# Patient Record
Sex: FEMALE | Race: WHITE | HISPANIC OR LATINO | Employment: OTHER | URBAN - METROPOLITAN AREA
[De-identification: names, ages, dates, MRNs, and addresses within clinical notes are randomized per-mention and may not be internally consistent; named-entity substitution may affect disease eponyms.]

---

## 2017-11-09 ENCOUNTER — HISTORICAL (OUTPATIENT)
Dept: CARDIOLOGY | Facility: HOSPITAL | Age: 82
End: 2017-11-09

## 2017-11-15 ENCOUNTER — HISTORICAL (OUTPATIENT)
Dept: ADMINISTRATIVE | Facility: HOSPITAL | Age: 82
End: 2017-11-15

## 2017-11-15 LAB
ALBUMIN SERPL-MCNC: 3.9 GM/DL (ref 3.4–5)
ALBUMIN/GLOB SERPL: 1.2 RATIO (ref 1.1–2)
ALP SERPL-CCNC: 103 UNIT/L (ref 38–126)
ALT SERPL-CCNC: 16 UNIT/L (ref 12–78)
APPEARANCE, UA: ABNORMAL
AST SERPL-CCNC: 8 UNIT/L (ref 15–37)
BACTERIA #/AREA URNS AUTO: ABNORMAL /HPF
BILIRUB SERPL-MCNC: 0.7 MG/DL (ref 0.2–1)
BILIRUB UR QL STRIP: NEGATIVE
BILIRUBIN DIRECT+TOT PNL SERPL-MCNC: 0.2 MG/DL (ref 0–0.5)
BILIRUBIN DIRECT+TOT PNL SERPL-MCNC: 0.5 MG/DL (ref 0–0.8)
BUN SERPL-MCNC: 24 MG/DL (ref 7–18)
CALCIUM SERPL-MCNC: 10.1 MG/DL (ref 8.5–10.1)
CHLORIDE SERPL-SCNC: 109 MMOL/L (ref 98–107)
CHOLEST SERPL-MCNC: 152 MG/DL (ref 0–200)
CHOLEST/HDLC SERPL: 2.8 {RATIO} (ref 0–4)
CO2 SERPL-SCNC: 25 MMOL/L (ref 21–32)
COLOR UR: YELLOW
CREAT SERPL-MCNC: 1.09 MG/DL (ref 0.55–1.02)
CREAT UR-MCNC: 167 MG/DL
DEPRECATED CALCIDIOL+CALCIFEROL SERPL-MC: 36.09 NG/ML (ref 30–80)
ERYTHROCYTE [DISTWIDTH] IN BLOOD BY AUTOMATED COUNT: 13.1 % (ref 11.5–17)
EST. AVERAGE GLUCOSE BLD GHB EST-MCNC: 131 MG/DL
GLOBULIN SER-MCNC: 3.3 GM/DL (ref 2.4–3.5)
GLUCOSE (UA): NEGATIVE
GLUCOSE SERPL-MCNC: 93 MG/DL (ref 74–106)
HBA1C MFR BLD: 6.2 % (ref 4.2–6.3)
HCT VFR BLD AUTO: 44.9 % (ref 37–47)
HDLC SERPL-MCNC: 55 MG/DL (ref 35–60)
HGB BLD-MCNC: 14.1 GM/DL (ref 12–16)
HGB UR QL STRIP: ABNORMAL
KETONES UR QL STRIP: NEGATIVE
LDLC SERPL CALC-MCNC: 80 MG/DL (ref 0–129)
LEUKOCYTE ESTERASE UR QL STRIP: ABNORMAL
MCH RBC QN AUTO: 28.7 PG (ref 27–31)
MCHC RBC AUTO-ENTMCNC: 31.4 GM/DL (ref 33–36)
MCV RBC AUTO: 91.4 FL (ref 80–94)
MICROALBUMIN UR-MCNC: 3.9 MG/DL
MICROALBUMIN/CREAT RATIO PNL UR: 23.5 MG/GM CR (ref 0–30)
NITRITE UR QL STRIP.AUTO: NEGATIVE
PH UR STRIP: 5.5 [PH] (ref 5–9)
PLATELET # BLD AUTO: 263 X10(3)/MCL (ref 130–400)
PMV BLD AUTO: 10.4 FL (ref 9.4–12.4)
POTASSIUM SERPL-SCNC: 4.2 MMOL/L (ref 3.5–5.1)
PROT SERPL-MCNC: 7.2 GM/DL (ref 6.4–8.2)
PROT UR QL STRIP: NEGATIVE
RBC # BLD AUTO: 4.91 X10(6)/MCL (ref 4.2–5.4)
RBC #/AREA URNS HPF: ABNORMAL /[HPF]
SODIUM SERPL-SCNC: 144 MMOL/L (ref 136–145)
SP GR UR STRIP: 1.02 (ref 1–1.03)
SQUAMOUS EPITHELIAL, UA: 6 /HPF (ref 0–4)
TRIGL SERPL-MCNC: 85 MG/DL (ref 30–150)
TSH SERPL-ACNC: 2.38 MIU/ML (ref 0.36–3.74)
UROBILINOGEN UR STRIP-ACNC: 0.2
VLDLC SERPL CALC-MCNC: 17 MG/DL
WBC # SPEC AUTO: 10 X10(3)/MCL (ref 4.5–11.5)
WBC #/AREA URNS AUTO: 71 /HPF (ref 0–3)

## 2022-07-18 ENCOUNTER — HOSPITAL ENCOUNTER (INPATIENT)
Facility: HOSPITAL | Age: 87
LOS: 15 days | Discharge: HOSPICE | DRG: 480 | End: 2022-08-02
Attending: EMERGENCY MEDICINE | Admitting: INTERNAL MEDICINE
Payer: MEDICARE

## 2022-07-18 DIAGNOSIS — S72.145A: Primary | ICD-10-CM

## 2022-07-18 DIAGNOSIS — C50.919 STAGE IV BREAST CANCER IN FEMALE: ICD-10-CM

## 2022-07-18 DIAGNOSIS — S72.142A DISPLACED INTERTROCHANTERIC FRACTURE OF LEFT FEMUR, INITIAL ENCOUNTER FOR CLOSED FRACTURE: ICD-10-CM

## 2022-07-18 DIAGNOSIS — Z01.818 PRE-OP EVALUATION: ICD-10-CM

## 2022-07-18 DIAGNOSIS — T14.90XA TRAUMA: ICD-10-CM

## 2022-07-18 DIAGNOSIS — R42 DIZZINESS: ICD-10-CM

## 2022-07-18 DIAGNOSIS — N63.31 MASS OF AXILLARY TAIL OF RIGHT BREAST: ICD-10-CM

## 2022-07-18 DIAGNOSIS — R09.02 HYPOXEMIA: ICD-10-CM

## 2022-07-18 LAB
ALBUMIN SERPL-MCNC: 3.2 GM/DL (ref 3.4–4.8)
ALBUMIN/GLOB SERPL: 1 RATIO (ref 1.1–2)
ALP SERPL-CCNC: 76 UNIT/L (ref 40–150)
ALT SERPL-CCNC: 23 UNIT/L (ref 0–55)
AMORPH PHOS CRY URNS QL MICRO: ABNORMAL /HPF
APPEARANCE UR: CLEAR
AST SERPL-CCNC: 23 UNIT/L (ref 5–34)
BACTERIA #/AREA URNS AUTO: ABNORMAL /HPF
BASOPHILS # BLD AUTO: 0.03 X10(3)/MCL (ref 0–0.2)
BASOPHILS NFR BLD AUTO: 0.2 %
BILIRUB UR QL STRIP.AUTO: NEGATIVE MG/DL
BILIRUBIN DIRECT+TOT PNL SERPL-MCNC: 0.9 MG/DL
BNP BLD-MCNC: 117 PG/ML
BUN SERPL-MCNC: 19.9 MG/DL (ref 9.8–20.1)
CALCIUM SERPL-MCNC: 9.8 MG/DL (ref 8.4–10.2)
CHLORIDE SERPL-SCNC: 106 MMOL/L (ref 98–111)
CO2 SERPL-SCNC: 23 MMOL/L (ref 23–31)
COLOR UR AUTO: YELLOW
CREAT SERPL-MCNC: 1 MG/DL (ref 0.55–1.02)
EOSINOPHIL # BLD AUTO: 0.01 X10(3)/MCL (ref 0–0.9)
EOSINOPHIL NFR BLD AUTO: 0.1 %
ERYTHROCYTE [DISTWIDTH] IN BLOOD BY AUTOMATED COUNT: 13.5 % (ref 11.5–17)
FLUAV AG UPPER RESP QL IA.RAPID: NOT DETECTED
FLUBV AG UPPER RESP QL IA.RAPID: NOT DETECTED
GLOBULIN SER-MCNC: 3.2 GM/DL (ref 2.4–3.5)
GLUCOSE SERPL-MCNC: 119 MG/DL (ref 75–121)
GLUCOSE UR QL STRIP.AUTO: NEGATIVE MG/DL
HCT VFR BLD AUTO: 38.4 % (ref 37–47)
HGB BLD-MCNC: 12.6 GM/DL (ref 12–16)
IMM GRANULOCYTES # BLD AUTO: 0.06 X10(3)/MCL (ref 0–0.04)
IMM GRANULOCYTES NFR BLD AUTO: 0.5 %
KETONES UR QL STRIP.AUTO: NEGATIVE MG/DL
LEUKOCYTE ESTERASE UR QL STRIP.AUTO: ABNORMAL UNIT/L
LYMPHOCYTES # BLD AUTO: 0.67 X10(3)/MCL (ref 0.6–4.6)
LYMPHOCYTES NFR BLD AUTO: 5.2 %
MCH RBC QN AUTO: 29.4 PG (ref 27–31)
MCHC RBC AUTO-ENTMCNC: 32.8 MG/DL (ref 33–36)
MCV RBC AUTO: 89.5 FL (ref 80–94)
MONOCYTES # BLD AUTO: 0.59 X10(3)/MCL (ref 0.1–1.3)
MONOCYTES NFR BLD AUTO: 4.5 %
NEUTROPHILS # BLD AUTO: 11.6 X10(3)/MCL (ref 2.1–9.2)
NEUTROPHILS NFR BLD AUTO: 89.5 %
NITRITE UR QL STRIP.AUTO: NEGATIVE
NRBC BLD AUTO-RTO: 0 %
PH UR STRIP.AUTO: 7 [PH]
PLATELET # BLD AUTO: 276 X10(3)/MCL (ref 130–400)
PMV BLD AUTO: 10.4 FL (ref 7.4–10.4)
POTASSIUM SERPL-SCNC: 4.4 MMOL/L (ref 3.5–5.1)
PROT SERPL-MCNC: 6.4 GM/DL (ref 5.8–7.6)
PROT UR QL STRIP.AUTO: NEGATIVE MG/DL
RBC # BLD AUTO: 4.29 X10(6)/MCL (ref 4.2–5.4)
RBC #/AREA URNS AUTO: ABNORMAL /HPF
RBC UR QL AUTO: NEGATIVE UNIT/L
SARS-COV-2 RNA RESP QL NAA+PROBE: NOT DETECTED
SODIUM SERPL-SCNC: 138 MMOL/L (ref 132–146)
SP GR UR STRIP.AUTO: 1.02
SQUAMOUS #/AREA URNS AUTO: ABNORMAL /HPF
TROPONIN I SERPL-MCNC: 0.02 NG/ML (ref 0–0.04)
TSH SERPL-ACNC: 2.49 UIU/ML (ref 0.35–4.94)
UROBILINOGEN UR STRIP-ACNC: 0.2 MG/DL
WBC # SPEC AUTO: 13 X10(3)/MCL (ref 4.5–11.5)
WBC #/AREA URNS AUTO: ABNORMAL /HPF

## 2022-07-18 PROCEDURE — 36415 COLL VENOUS BLD VENIPUNCTURE: CPT | Performed by: EMERGENCY MEDICINE

## 2022-07-18 PROCEDURE — 84484 ASSAY OF TROPONIN QUANT: CPT | Performed by: EMERGENCY MEDICINE

## 2022-07-18 PROCEDURE — 81001 URINALYSIS AUTO W/SCOPE: CPT | Performed by: EMERGENCY MEDICINE

## 2022-07-18 PROCEDURE — 11000001 HC ACUTE MED/SURG PRIVATE ROOM

## 2022-07-18 PROCEDURE — 80053 COMPREHEN METABOLIC PANEL: CPT | Performed by: EMERGENCY MEDICINE

## 2022-07-18 PROCEDURE — 93005 ELECTROCARDIOGRAM TRACING: CPT

## 2022-07-18 PROCEDURE — 84443 ASSAY THYROID STIM HORMONE: CPT | Performed by: EMERGENCY MEDICINE

## 2022-07-18 PROCEDURE — 93010 EKG 12-LEAD: ICD-10-PCS | Mod: ,,, | Performed by: INTERNAL MEDICINE

## 2022-07-18 PROCEDURE — 25500020 PHARM REV CODE 255: Performed by: EMERGENCY MEDICINE

## 2022-07-18 PROCEDURE — 99285 EMERGENCY DEPT VISIT HI MDM: CPT | Mod: 25

## 2022-07-18 PROCEDURE — 87636 SARSCOV2 & INF A&B AMP PRB: CPT | Performed by: EMERGENCY MEDICINE

## 2022-07-18 PROCEDURE — 85025 COMPLETE CBC W/AUTO DIFF WBC: CPT | Performed by: EMERGENCY MEDICINE

## 2022-07-18 PROCEDURE — 63600175 PHARM REV CODE 636 W HCPCS: Performed by: EMERGENCY MEDICINE

## 2022-07-18 PROCEDURE — 93010 ELECTROCARDIOGRAM REPORT: CPT | Mod: ,,, | Performed by: INTERNAL MEDICINE

## 2022-07-18 PROCEDURE — 83880 ASSAY OF NATRIURETIC PEPTIDE: CPT | Performed by: EMERGENCY MEDICINE

## 2022-07-18 RX ORDER — TALC
6 POWDER (GRAM) TOPICAL NIGHTLY PRN
Status: DISCONTINUED | OUTPATIENT
Start: 2022-07-18 | End: 2022-07-19

## 2022-07-18 RX ORDER — LEVOTHYROXINE SODIUM 50 UG/1
50 TABLET ORAL DAILY
COMMUNITY
Start: 2022-05-08

## 2022-07-18 RX ORDER — HEPARIN SODIUM 5000 [USP'U]/ML
5000 INJECTION, SOLUTION INTRAVENOUS; SUBCUTANEOUS EVERY 12 HOURS
Status: DISCONTINUED | OUTPATIENT
Start: 2022-07-18 | End: 2022-07-19

## 2022-07-18 RX ORDER — LISINOPRIL 20 MG/1
20 TABLET ORAL 2 TIMES DAILY
COMMUNITY
Start: 2022-02-17

## 2022-07-18 RX ORDER — ONDANSETRON 2 MG/ML
4 INJECTION INTRAMUSCULAR; INTRAVENOUS EVERY 8 HOURS PRN
Status: DISCONTINUED | OUTPATIENT
Start: 2022-07-18 | End: 2022-07-19

## 2022-07-18 RX ORDER — ATORVASTATIN CALCIUM 10 MG/1
10 TABLET, FILM COATED ORAL DAILY
Status: ON HOLD | COMMUNITY
Start: 2022-06-22 | End: 2022-08-02 | Stop reason: HOSPADM

## 2022-07-18 RX ORDER — HYDRALAZINE HYDROCHLORIDE 50 MG/1
TABLET, FILM COATED ORAL
Status: ON HOLD | COMMUNITY
Start: 2022-06-01 | End: 2022-08-02 | Stop reason: HOSPADM

## 2022-07-18 RX ORDER — MORPHINE SULFATE 4 MG/ML
1 INJECTION, SOLUTION INTRAMUSCULAR; INTRAVENOUS EVERY 4 HOURS PRN
Status: DISCONTINUED | OUTPATIENT
Start: 2022-07-18 | End: 2022-07-19

## 2022-07-18 RX ORDER — SODIUM CHLORIDE 0.9 % (FLUSH) 0.9 %
10 SYRINGE (ML) INJECTION
Status: DISCONTINUED | OUTPATIENT
Start: 2022-07-18 | End: 2022-07-19

## 2022-07-18 RX ORDER — METOPROLOL SUCCINATE 50 MG/1
50 TABLET, EXTENDED RELEASE ORAL DAILY
COMMUNITY
Start: 2022-05-08

## 2022-07-18 RX ADMIN — IOPAMIDOL 100 ML: 755 INJECTION, SOLUTION INTRAVENOUS at 08:07

## 2022-07-18 RX ADMIN — HEPARIN SODIUM 5000 UNITS: 5000 INJECTION, SOLUTION INTRAVENOUS; SUBCUTANEOUS at 09:07

## 2022-07-18 RX ADMIN — ONDANSETRON HYDROCHLORIDE 4 MG: 2 SOLUTION INTRAMUSCULAR; INTRAVENOUS at 08:07

## 2022-07-18 RX ADMIN — MORPHINE SULFATE 1 MG: 4 INJECTION INTRAVENOUS at 08:07

## 2022-07-18 NOTE — ED PROVIDER NOTES
Encounter Date: 2022       History     Chief Complaint   Patient presents with    Dizziness     Near-syncope, fall, and lt hip pain     Patient is a 89-year-old female who was at her home with her male relative who is currently with her when she stood from the toilet and fell.  She complains of pain in her left groin area only when moving her left hip.  The mail her the fall and found her on the 4 without any mental status changes.  She states she has no pain elsewhere.  She did not hit her head.  She has no neck pain.  He says that she recently lost her dog who  and lived with her for a long time, and her p.o. intake has been decreased.  He also states that she is on antihypertensives chronically.  Otherwise she has not been sick with a cough cold runny nose sore throat nausea vomiting or diarrhea.  She has fallen in the past in a similar fashion.        Review of patient's allergies indicates:   Allergen Reactions    Atropine     Diazepam     Promethazine      No past medical history on file.  No past surgical history on file.  No family history on file.     Review of Systems   Constitutional: Negative for fever.   HENT: Negative for sore throat.    Respiratory: Negative for shortness of breath.    Cardiovascular: Negative for chest pain.   Gastrointestinal: Negative for nausea.   Genitourinary: Negative for dysuria.   Musculoskeletal: Negative for back pain.   Skin: Negative for rash.   Neurological: Negative for weakness.   Hematological: Does not bruise/bleed easily.   All other systems reviewed and are negative.      Physical Exam     Initial Vitals [22 1652]   BP Pulse Resp Temp SpO2   (!) 148/86 74 18 98.6 °F (37 °C) 95 %      MAP       --         Physical Exam    Nursing note and vitals reviewed.  Constitutional: She appears well-developed. No distress.   HENT:   Head: Normocephalic and atraumatic.   Mouth/Throat: Oropharynx is clear and moist.   Eyes: Conjunctivae are normal.   Neck:  Neck supple.   No spine tenderness   Normal range of motion.  Cardiovascular: Normal rate, regular rhythm and normal heart sounds. Exam reveals no gallop and no friction rub.    No murmur heard.  Pulmonary/Chest: Breath sounds normal. No respiratory distress. She has no wheezes. She has no rhonchi. She has no rales.   Abdominal: Abdomen is soft. Bowel sounds are normal. She exhibits no distension. There is no abdominal tenderness. There is no guarding.   Musculoskeletal:         General: No edema.      Cervical back: Normal range of motion and neck supple.      Comments: Left hip is nontender.  There is no external rotation or shortening.     Lymphadenopathy:     She has no cervical adenopathy.   Neurological: She is alert. She displays a negative Romberg sign. Coordination and gait normal. GCS eye subscore is 4. GCS verbal subscore is 5. GCS motor subscore is 6.   Skin: Skin is warm.   Psychiatric: She has a normal mood and affect.         ED Course   Procedures  Labs Reviewed   COMPREHENSIVE METABOLIC PANEL - Abnormal; Notable for the following components:       Result Value    Albumin Level 3.2 (*)     Albumin/Globulin Ratio 1.0 (*)     All other components within normal limits   CBC WITH DIFFERENTIAL - Abnormal; Notable for the following components:    WBC 13.0 (*)     MCHC 32.8 (*)     Neut # 11.6 (*)     IG# 0.06 (*)     All other components within normal limits   B-TYPE NATRIURETIC PEPTIDE - Abnormal; Notable for the following components:    Natriuretic Peptide 117.0 (*)     All other components within normal limits   TSH - Normal   TROPONIN I - Normal   CBC W/ AUTO DIFFERENTIAL    Narrative:     The following orders were created for panel order CBC auto differential.  Procedure                               Abnormality         Status                     ---------                               -----------         ------                     CBC with Differential[[id number]]        Abnormal            Final  result                 Please view results for these tests on the individual orders.   URINALYSIS, REFLEX TO URINE CULTURE   SARS-COV-2 RNA AMPLIFICATION, QUAL     EKG Readings: (Independently Interpreted)   Initial Reading: No STEMI.   EKG time 6:13 p.m. rate 72 normal sinus rhythm axis normal inverted T-waves in V3 4 and 5 QTC is normal       Imaging Results          X-Ray Chest AP Portable (Final result)  Result time 07/18/22 18:26:37    Final result by Selwyn Pink MD (07/18/22 18:26:37)                 Impression:      Areas of hazy right lung opacity, greatest inferiorly.  Some of this may relate to overlying soft tissue, but pneumonia and right pleural effusion also possible.  Follow-up PA and lateral chest radiographs can be considered.      Electronically signed by: Selwyn Pink  Date:    07/18/2022  Time:    18:26             Narrative:    EXAMINATION:  XR CHEST AP PORTABLE    CLINICAL HISTORY:  hypoxemia;    TECHNIQUE:  Frontal view(s) of the chest.    COMPARISON:  Radiography 01/09/2018    FINDINGS:  Hazy opacity in the lower right lung and extending peripherally over the mid and upper right lung.  No consolidation or significant pleural fluid on the left.  No definitive pneumothorax.  Cardiac silhouette within normal limits for technique.                               X-Ray Femur Ap/Lat Left (Final result)  Result time 07/18/22 18:09:34    Final result by Hayley Wilson MD (07/18/22 18:09:34)                 Impression:      Intertrochanteric fracture left hip      Electronically signed by: Hayley Wilson  Date:    07/18/2022  Time:    18:09             Narrative:    EXAMINATION:  XR FEMUR 2 VIEW LEFT    CLINICAL HISTORY:  Injury, unspecified, initial encounter    TECHNIQUE:  AP and lateral views of the left femur were performed.    COMPARISON:  None}    FINDINGS:  There is an inter trochanteric fracture of the left hip.  No significant displacement is seen.  No angulation is seen.  No  other fractures are seen.                               CT Pelvis Without Contrast (Final result)  Result time 07/18/22 18:08:48    Final result by Hayley Wilson MD (07/18/22 18:08:48)                 Impression:      Intertrochanteric fracture seen on the left side as outlined above      Electronically signed by: Hayley Wilson  Date:    07/18/2022  Time:    18:08             Narrative:    EXAMINATION:  CT PELVIS WITHOUT CONTRAST    CLINICAL HISTORY:  Pelvic fracture;    TECHNIQUE:  Multiple axial images were obtained of the pelvis from the iliac crest to the pubic symphysis and sagittal and coronal reconstructions were performed.  No contrast was administered.    COMPARISON:  None    FINDINGS:  There is a fracture along the intertrochanteric region on the left side.  Fracture is not displaced.  There is comminution of the fracture at the level of the greater trochanter.  The ischium appears to be intact on the right and left side.  Iliac bone is intact on the right and left side.  The right proximal femur appears to be intact.  The sacrum is intact.                                 Medications   sodium chloride 0.9% flush 10 mL (has no administration in time range)   melatonin tablet 6 mg (has no administration in time range)   heparin (porcine) injection 5,000 Units (has no administration in time range)   morphine injection 1 mg (has no administration in time range)   ondansetron injection 4 mg (has no administration in time range)     Medical Decision Making:   Clinical Tests:   Lab Tests: Reviewed  Radiological Study: Reviewed  Other:   I have discussed this case with another health care provider.       <> Summary of the Discussion: Dr. Plummer recommends patient be admitted to the hospital for surgery in the morning.  He recommends admission to Dr. Hale who has been paged.             ED Course as of 07/18/22 1923 Mon Jul 18, 2022 1919 Work up and admit orders completed by previous ED MD. I  spoke with Dr. Eduardo coronel to admit to him.  [AG]      ED Course User Index  [AG] Gerald Goldstein MD             Clinical Impression:   Final diagnoses:  [T14.90XA] Trauma  [R09.02] Hypoxemia  [S72.145A] Nondisplaced intertrochanteric fracture of left femur, init (Primary)  [R42] Dizziness          ED Disposition Condition    Admit               Gerald Goldstein MD  07/18/22 1925

## 2022-07-19 ENCOUNTER — ANESTHESIA (OUTPATIENT)
Dept: SURGERY | Facility: HOSPITAL | Age: 87
DRG: 480 | End: 2022-07-19
Payer: MEDICARE

## 2022-07-19 ENCOUNTER — ANESTHESIA EVENT (OUTPATIENT)
Dept: SURGERY | Facility: HOSPITAL | Age: 87
DRG: 480 | End: 2022-07-19
Payer: MEDICARE

## 2022-07-19 DIAGNOSIS — S72.142A DISPLACED INTERTROCHANTERIC FRACTURE OF LEFT FEMUR, INITIAL ENCOUNTER FOR CLOSED FRACTURE: Primary | ICD-10-CM

## 2022-07-19 PROBLEM — S72.145A: Status: ACTIVE | Noted: 2022-07-19

## 2022-07-19 LAB
HCT VFR BLD AUTO: 36.3 % (ref 37–47)
HGB BLD-MCNC: 11.6 GM/DL (ref 12–16)

## 2022-07-19 PROCEDURE — 25000003 PHARM REV CODE 250: Performed by: PHYSICIAN ASSISTANT

## 2022-07-19 PROCEDURE — 37000008 HC ANESTHESIA 1ST 15 MINUTES: Performed by: SPECIALIST

## 2022-07-19 PROCEDURE — 63600175 PHARM REV CODE 636 W HCPCS: Performed by: SPECIALIST

## 2022-07-19 PROCEDURE — 27245 TREAT THIGH FRACTURE: CPT | Mod: AS,LT,, | Performed by: PHYSICIAN ASSISTANT

## 2022-07-19 PROCEDURE — 63600175 PHARM REV CODE 636 W HCPCS: Performed by: NURSE ANESTHETIST, CERTIFIED REGISTERED

## 2022-07-19 PROCEDURE — 36000710: Performed by: SPECIALIST

## 2022-07-19 PROCEDURE — 37000009 HC ANESTHESIA EA ADD 15 MINS: Performed by: SPECIALIST

## 2022-07-19 PROCEDURE — 25000003 PHARM REV CODE 250: Performed by: NURSE ANESTHETIST, CERTIFIED REGISTERED

## 2022-07-19 PROCEDURE — 25000003 PHARM REV CODE 250: Performed by: SPECIALIST

## 2022-07-19 PROCEDURE — C1769 GUIDE WIRE: HCPCS | Performed by: SPECIALIST

## 2022-07-19 PROCEDURE — 11000001 HC ACUTE MED/SURG PRIVATE ROOM

## 2022-07-19 PROCEDURE — 27245 PR OPEN FIX INTER/SUBTROCH FX,IMPLNT: ICD-10-PCS | Mod: AS,LT,, | Performed by: PHYSICIAN ASSISTANT

## 2022-07-19 PROCEDURE — 27800903 OPTIME MED/SURG SUP & DEVICES OTHER IMPLANTS: Performed by: SPECIALIST

## 2022-07-19 PROCEDURE — 27245 TREAT THIGH FRACTURE: CPT | Mod: LT,,, | Performed by: SPECIALIST

## 2022-07-19 PROCEDURE — 85014 HEMATOCRIT: CPT | Performed by: SPECIALIST

## 2022-07-19 PROCEDURE — 25000003 PHARM REV CODE 250: Performed by: EMERGENCY MEDICINE

## 2022-07-19 PROCEDURE — 36000711: Performed by: SPECIALIST

## 2022-07-19 PROCEDURE — 27201423 OPTIME MED/SURG SUP & DEVICES STERILE SUPPLY: Performed by: SPECIALIST

## 2022-07-19 PROCEDURE — 63600175 PHARM REV CODE 636 W HCPCS: Performed by: INTERNAL MEDICINE

## 2022-07-19 PROCEDURE — 63600175 PHARM REV CODE 636 W HCPCS: Performed by: PHYSICIAN ASSISTANT

## 2022-07-19 PROCEDURE — 71000039 HC RECOVERY, EACH ADD'L HOUR: Performed by: SPECIALIST

## 2022-07-19 PROCEDURE — 71000033 HC RECOVERY, INTIAL HOUR: Performed by: SPECIALIST

## 2022-07-19 PROCEDURE — 27245 PR OPEN FIX INTER/SUBTROCH FX,IMPLNT: ICD-10-PCS | Mod: LT,,, | Performed by: SPECIALIST

## 2022-07-19 PROCEDURE — 27000221 HC OXYGEN, UP TO 24 HOURS

## 2022-07-19 PROCEDURE — 36415 COLL VENOUS BLD VENIPUNCTURE: CPT | Performed by: SPECIALIST

## 2022-07-19 PROCEDURE — C1713 ANCHOR/SCREW BN/BN,TIS/BN: HCPCS | Performed by: SPECIALIST

## 2022-07-19 DEVICE — TROCHANTERIC NAIL KIT, TI
Type: IMPLANTABLE DEVICE | Site: HIP | Status: FUNCTIONAL
Brand: GAMMA

## 2022-07-19 DEVICE — IMPLANTABLE DEVICE: Type: IMPLANTABLE DEVICE | Site: HIP | Status: FUNCTIONAL

## 2022-07-19 DEVICE — LOCKING SCREW, FULLY THREADED: Type: IMPLANTABLE DEVICE | Site: HIP | Status: FUNCTIONAL

## 2022-07-19 DEVICE — LAG SCREW, TI
Type: IMPLANTABLE DEVICE | Site: HIP | Status: FUNCTIONAL
Brand: GAMMA

## 2022-07-19 RX ORDER — AMOXICILLIN 250 MG
2 CAPSULE ORAL NIGHTLY
Status: DISCONTINUED | OUTPATIENT
Start: 2022-07-19 | End: 2022-08-02 | Stop reason: HOSPADM

## 2022-07-19 RX ORDER — ACETAMINOPHEN 500 MG
1000 TABLET ORAL
Status: COMPLETED | OUTPATIENT
Start: 2022-07-19 | End: 2022-07-19

## 2022-07-19 RX ORDER — METHOCARBAMOL 500 MG/1
500 TABLET, FILM COATED ORAL EVERY 8 HOURS PRN
Status: DISCONTINUED | OUTPATIENT
Start: 2022-07-19 | End: 2022-08-02 | Stop reason: HOSPADM

## 2022-07-19 RX ORDER — CEFAZOLIN SODIUM 2 G/50ML
2 SOLUTION INTRAVENOUS
Status: DISCONTINUED | OUTPATIENT
Start: 2022-07-19 | End: 2022-07-20

## 2022-07-19 RX ORDER — LISINOPRIL 20 MG/1
20 TABLET ORAL 2 TIMES DAILY
Status: DISCONTINUED | OUTPATIENT
Start: 2022-07-19 | End: 2022-08-02 | Stop reason: HOSPADM

## 2022-07-19 RX ORDER — HYDRALAZINE HYDROCHLORIDE 25 MG/1
25 TABLET, FILM COATED ORAL EVERY 12 HOURS
Status: DISCONTINUED | OUTPATIENT
Start: 2022-07-19 | End: 2022-08-02 | Stop reason: HOSPADM

## 2022-07-19 RX ORDER — DIPHENHYDRAMINE HYDROCHLORIDE 50 MG/ML
25 INJECTION INTRAMUSCULAR; INTRAVENOUS EVERY 6 HOURS PRN
Status: DISCONTINUED | OUTPATIENT
Start: 2022-07-19 | End: 2022-08-02 | Stop reason: HOSPADM

## 2022-07-19 RX ORDER — DEXAMETHASONE SODIUM PHOSPHATE 4 MG/ML
INJECTION, SOLUTION INTRA-ARTICULAR; INTRALESIONAL; INTRAMUSCULAR; INTRAVENOUS; SOFT TISSUE
Status: DISCONTINUED | OUTPATIENT
Start: 2022-07-19 | End: 2022-07-19

## 2022-07-19 RX ORDER — PHENYLEPHRINE HYDROCHLORIDE 10 MG/ML
INJECTION INTRAVENOUS CONTINUOUS PRN
Status: DISCONTINUED | OUTPATIENT
Start: 2022-07-19 | End: 2022-07-19

## 2022-07-19 RX ORDER — ACETAMINOPHEN 500 MG
1000 TABLET ORAL
Status: CANCELLED | OUTPATIENT
Start: 2022-07-19 | End: 2022-07-19

## 2022-07-19 RX ORDER — FAMOTIDINE 20 MG/1
20 TABLET, FILM COATED ORAL DAILY
Status: DISCONTINUED | OUTPATIENT
Start: 2022-07-20 | End: 2022-08-02 | Stop reason: HOSPADM

## 2022-07-19 RX ORDER — MORPHINE SULFATE 4 MG/ML
2 INJECTION, SOLUTION INTRAMUSCULAR; INTRAVENOUS
Status: ACTIVE | OUTPATIENT
Start: 2022-07-19 | End: 2022-07-20

## 2022-07-19 RX ORDER — SODIUM CHLORIDE, SODIUM GLUCONATE, SODIUM ACETATE, POTASSIUM CHLORIDE AND MAGNESIUM CHLORIDE 30; 37; 368; 526; 502 MG/100ML; MG/100ML; MG/100ML; MG/100ML; MG/100ML
1000 INJECTION, SOLUTION INTRAVENOUS CONTINUOUS
Status: CANCELLED | OUTPATIENT
Start: 2022-07-19 | End: 2022-08-18

## 2022-07-19 RX ORDER — POLYETHYLENE GLYCOL 3350 17 G/17G
17 POWDER, FOR SOLUTION ORAL 2 TIMES DAILY
Status: DISCONTINUED | OUTPATIENT
Start: 2022-07-19 | End: 2022-08-02 | Stop reason: HOSPADM

## 2022-07-19 RX ORDER — METOPROLOL SUCCINATE 50 MG/1
50 TABLET, EXTENDED RELEASE ORAL DAILY
Status: DISCONTINUED | OUTPATIENT
Start: 2022-07-20 | End: 2022-08-02 | Stop reason: HOSPADM

## 2022-07-19 RX ORDER — ONDANSETRON 2 MG/ML
4 INJECTION INTRAMUSCULAR; INTRAVENOUS EVERY 6 HOURS PRN
Status: DISCONTINUED | OUTPATIENT
Start: 2022-07-19 | End: 2022-08-02 | Stop reason: HOSPADM

## 2022-07-19 RX ORDER — TRAMADOL HYDROCHLORIDE 50 MG/1
50 TABLET ORAL EVERY 4 HOURS PRN
Status: DISCONTINUED | OUTPATIENT
Start: 2022-07-19 | End: 2022-07-31

## 2022-07-19 RX ORDER — ONDANSETRON 2 MG/ML
4 INJECTION INTRAMUSCULAR; INTRAVENOUS DAILY PRN
Status: DISCONTINUED | OUTPATIENT
Start: 2022-07-19 | End: 2022-08-02 | Stop reason: HOSPADM

## 2022-07-19 RX ORDER — CEFAZOLIN SODIUM 1 G/3ML
INJECTION, POWDER, FOR SOLUTION INTRAMUSCULAR; INTRAVENOUS
Status: DISCONTINUED | OUTPATIENT
Start: 2022-07-19 | End: 2022-07-19

## 2022-07-19 RX ORDER — ONDANSETRON 2 MG/ML
INJECTION INTRAMUSCULAR; INTRAVENOUS
Status: DISCONTINUED | OUTPATIENT
Start: 2022-07-19 | End: 2022-07-19

## 2022-07-19 RX ORDER — HYDROMORPHONE HYDROCHLORIDE 2 MG/ML
0.2 INJECTION, SOLUTION INTRAMUSCULAR; INTRAVENOUS; SUBCUTANEOUS EVERY 5 MIN PRN
Status: DISCONTINUED | OUTPATIENT
Start: 2022-07-19 | End: 2022-08-02 | Stop reason: HOSPADM

## 2022-07-19 RX ORDER — ACETAMINOPHEN 500 MG
500 TABLET ORAL
Status: DISCONTINUED | OUTPATIENT
Start: 2022-07-19 | End: 2022-07-31

## 2022-07-19 RX ORDER — LACTULOSE 10 G/15ML
20 SOLUTION ORAL EVERY 6 HOURS PRN
Status: DISCONTINUED | OUTPATIENT
Start: 2022-07-19 | End: 2022-08-02 | Stop reason: HOSPADM

## 2022-07-19 RX ORDER — CALCIUM CARBONATE 200(500)MG
500 TABLET,CHEWABLE ORAL 3 TIMES DAILY PRN
Status: DISCONTINUED | OUTPATIENT
Start: 2022-07-19 | End: 2022-08-02 | Stop reason: HOSPADM

## 2022-07-19 RX ORDER — SODIUM CHLORIDE 9 MG/ML
INJECTION, SOLUTION INTRAVENOUS CONTINUOUS
Status: DISCONTINUED | OUTPATIENT
Start: 2022-07-19 | End: 2022-07-23

## 2022-07-19 RX ORDER — SODIUM CHLORIDE AND POTASSIUM CHLORIDE 150; 900 MG/100ML; MG/100ML
INJECTION, SOLUTION INTRAVENOUS
Status: COMPLETED | OUTPATIENT
Start: 2022-07-19 | End: 2022-07-19

## 2022-07-19 RX ORDER — LIDOCAINE HYDROCHLORIDE 20 MG/ML
INJECTION, SOLUTION EPIDURAL; INFILTRATION; INTRACAUDAL; PERINEURAL
Status: DISCONTINUED | OUTPATIENT
Start: 2022-07-19 | End: 2022-07-19

## 2022-07-19 RX ORDER — GABAPENTIN 300 MG/1
600 CAPSULE ORAL
Status: DISCONTINUED | OUTPATIENT
Start: 2022-07-19 | End: 2022-07-19

## 2022-07-19 RX ORDER — EPHEDRINE SULFATE 50 MG/ML
INJECTION, SOLUTION INTRAVENOUS
Status: DISCONTINUED | OUTPATIENT
Start: 2022-07-19 | End: 2022-07-19

## 2022-07-19 RX ORDER — METOCLOPRAMIDE HYDROCHLORIDE 5 MG/ML
10 INJECTION INTRAMUSCULAR; INTRAVENOUS EVERY 6 HOURS
Status: COMPLETED | OUTPATIENT
Start: 2022-07-19 | End: 2022-07-20

## 2022-07-19 RX ORDER — FENTANYL CITRATE 50 UG/ML
INJECTION, SOLUTION INTRAMUSCULAR; INTRAVENOUS
Status: DISCONTINUED | OUTPATIENT
Start: 2022-07-19 | End: 2022-07-19

## 2022-07-19 RX ORDER — PROPOFOL 10 MG/ML
VIAL (ML) INTRAVENOUS
Status: DISCONTINUED | OUTPATIENT
Start: 2022-07-19 | End: 2022-07-19

## 2022-07-19 RX ORDER — ROCURONIUM BROMIDE 10 MG/ML
INJECTION, SOLUTION INTRAVENOUS
Status: DISCONTINUED | OUTPATIENT
Start: 2022-07-19 | End: 2022-07-19

## 2022-07-19 RX ORDER — KETOROLAC TROMETHAMINE 30 MG/ML
15 INJECTION, SOLUTION INTRAMUSCULAR; INTRAVENOUS EVERY 6 HOURS
Status: COMPLETED | OUTPATIENT
Start: 2022-07-19 | End: 2022-07-20

## 2022-07-19 RX ORDER — ENOXAPARIN SODIUM 100 MG/ML
30 INJECTION SUBCUTANEOUS EVERY 24 HOURS
Status: DISCONTINUED | OUTPATIENT
Start: 2022-07-20 | End: 2022-07-21

## 2022-07-19 RX ORDER — MAG HYDROX/ALUMINUM HYD/SIMETH 200-200-20
30 SUSPENSION, ORAL (FINAL DOSE FORM) ORAL EVERY 6 HOURS PRN
Status: DISCONTINUED | OUTPATIENT
Start: 2022-07-19 | End: 2022-08-02 | Stop reason: HOSPADM

## 2022-07-19 RX ORDER — GABAPENTIN 300 MG/1
300 CAPSULE ORAL
Status: COMPLETED | OUTPATIENT
Start: 2022-07-19 | End: 2022-07-19

## 2022-07-19 RX ORDER — TALC
6 POWDER (GRAM) TOPICAL NIGHTLY PRN
Status: DISCONTINUED | OUTPATIENT
Start: 2022-07-19 | End: 2022-08-02 | Stop reason: HOSPADM

## 2022-07-19 RX ORDER — SODIUM CHLORIDE 9 MG/ML
INJECTION, SOLUTION INTRAVENOUS CONTINUOUS
Status: CANCELLED | OUTPATIENT
Start: 2022-07-19

## 2022-07-19 RX ORDER — CEFAZOLIN SODIUM 2 G/50ML
2 SOLUTION INTRAVENOUS
Status: DISCONTINUED | OUTPATIENT
Start: 2022-07-19 | End: 2022-07-19

## 2022-07-19 RX ORDER — CEFAZOLIN SODIUM 2 G/50ML
2 SOLUTION INTRAVENOUS
Status: CANCELLED | OUTPATIENT
Start: 2022-07-19

## 2022-07-19 RX ORDER — KETOROLAC TROMETHAMINE 30 MG/ML
15 INJECTION, SOLUTION INTRAMUSCULAR; INTRAVENOUS
Status: CANCELLED | OUTPATIENT
Start: 2022-07-19 | End: 2022-07-19

## 2022-07-19 RX ORDER — LEVOTHYROXINE SODIUM 50 UG/1
50 TABLET ORAL DAILY
Status: DISCONTINUED | OUTPATIENT
Start: 2022-07-20 | End: 2022-08-02 | Stop reason: HOSPADM

## 2022-07-19 RX ORDER — BISACODYL 10 MG
10 SUPPOSITORY, RECTAL RECTAL DAILY
Status: ACTIVE | OUTPATIENT
Start: 2022-07-22 | End: 2022-07-23

## 2022-07-19 RX ORDER — LIDOCAINE HYDROCHLORIDE 10 MG/ML
1 INJECTION, SOLUTION EPIDURAL; INFILTRATION; INTRACAUDAL; PERINEURAL ONCE
Status: CANCELLED | OUTPATIENT
Start: 2022-07-19 | End: 2022-07-19

## 2022-07-19 RX ORDER — KETOROLAC TROMETHAMINE 30 MG/ML
15 INJECTION, SOLUTION INTRAMUSCULAR; INTRAVENOUS
Status: COMPLETED | OUTPATIENT
Start: 2022-07-19 | End: 2022-07-19

## 2022-07-19 RX ORDER — GABAPENTIN 300 MG/1
600 CAPSULE ORAL
Status: CANCELLED | OUTPATIENT
Start: 2022-07-19

## 2022-07-19 RX ADMIN — FENTANYL CITRATE 25 MCG: 50 INJECTION, SOLUTION INTRAMUSCULAR; INTRAVENOUS at 02:07

## 2022-07-19 RX ADMIN — SENNOSIDES AND DOCUSATE SODIUM 2 TABLET: 50; 8.6 TABLET ORAL at 09:07

## 2022-07-19 RX ADMIN — ROCURONIUM BROMIDE 25 MG: 10 SOLUTION INTRAVENOUS at 01:07

## 2022-07-19 RX ADMIN — POLYETHYLENE GLYCOL 3350 17 G: 17 POWDER, FOR SOLUTION ORAL at 09:07

## 2022-07-19 RX ADMIN — CEFAZOLIN 2 G: 330 INJECTION, POWDER, FOR SOLUTION INTRAMUSCULAR; INTRAVENOUS at 01:07

## 2022-07-19 RX ADMIN — NORETHINDRONE AND ETHINYL ESTRADIOL 10 MG: KIT ORAL at 01:07

## 2022-07-19 RX ADMIN — PROPOFOL 80 MG: 10 INJECTION, EMULSION INTRAVENOUS at 01:07

## 2022-07-19 RX ADMIN — PHENYLEPHRINE HYDROCHLORIDE 0.2 MCG/KG/MIN: 10 INJECTION INTRAVENOUS at 01:07

## 2022-07-19 RX ADMIN — METHOCARBAMOL 500 MG: 500 TABLET ORAL at 09:07

## 2022-07-19 RX ADMIN — ACETAMINOPHEN 500 MG: 500 TABLET, FILM COATED ORAL at 06:07

## 2022-07-19 RX ADMIN — CEFAZOLIN SODIUM 2 G: 2 SOLUTION INTRAVENOUS at 11:07

## 2022-07-19 RX ADMIN — MELATONIN 6 MG: 3 TAB ORAL at 01:07

## 2022-07-19 RX ADMIN — FENTANYL CITRATE 25 MCG: 50 INJECTION, SOLUTION INTRAMUSCULAR; INTRAVENOUS at 01:07

## 2022-07-19 RX ADMIN — GABAPENTIN 300 MG: 300 CAPSULE ORAL at 12:07

## 2022-07-19 RX ADMIN — SODIUM CHLORIDE AND POTASSIUM CHLORIDE: .9; .15 SOLUTION INTRAVENOUS at 01:07

## 2022-07-19 RX ADMIN — KETOROLAC TROMETHAMINE 15 MG: 30 INJECTION, SOLUTION INTRAMUSCULAR; INTRAVENOUS at 11:07

## 2022-07-19 RX ADMIN — SUGAMMADEX 100 MG: 100 INJECTION, SOLUTION INTRAVENOUS at 02:07

## 2022-07-19 RX ADMIN — LIDOCAINE HYDROCHLORIDE 50 MG: 20 INJECTION, SOLUTION EPIDURAL; INFILTRATION; INTRACAUDAL; PERINEURAL at 01:07

## 2022-07-19 RX ADMIN — ACETAMINOPHEN 1000 MG: 500 TABLET ORAL at 12:07

## 2022-07-19 RX ADMIN — PHENYLEPHRINE HYDROCHLORIDE 200 MCG/KG/MIN: 10 INJECTION INTRAVENOUS at 01:07

## 2022-07-19 RX ADMIN — METOCLOPRAMIDE 10 MG: 5 INJECTION, SOLUTION INTRAMUSCULAR; INTRAVENOUS at 11:07

## 2022-07-19 RX ADMIN — DEXAMETHASONE SODIUM PHOSPHATE 4 MG: 4 INJECTION, SOLUTION INTRA-ARTICULAR; INTRALESIONAL; INTRAMUSCULAR; INTRAVENOUS; SOFT TISSUE at 01:07

## 2022-07-19 RX ADMIN — SODIUM CHLORIDE, SODIUM GLUCONATE, SODIUM ACETATE, POTASSIUM CHLORIDE AND MAGNESIUM CHLORIDE: 526; 502; 368; 37; 30 INJECTION, SOLUTION INTRAVENOUS at 01:07

## 2022-07-19 RX ADMIN — SODIUM CHLORIDE: 9 INJECTION, SOLUTION INTRAVENOUS at 06:07

## 2022-07-19 RX ADMIN — ONDANSETRON HYDROCHLORIDE 4 MG: 2 SOLUTION INTRAMUSCULAR; INTRAVENOUS at 01:07

## 2022-07-19 RX ADMIN — KETOROLAC TROMETHAMINE 15 MG: 30 INJECTION, SOLUTION INTRAMUSCULAR; INTRAVENOUS at 06:07

## 2022-07-19 RX ADMIN — KETOROLAC TROMETHAMINE 15 MG: 30 INJECTION, SOLUTION INTRAMUSCULAR at 12:07

## 2022-07-19 RX ADMIN — METOCLOPRAMIDE 10 MG: 5 INJECTION, SOLUTION INTRAMUSCULAR; INTRAVENOUS at 06:07

## 2022-07-19 NOTE — ANESTHESIA PROCEDURE NOTES
Intubation    Date/Time: 7/19/2022 1:17 PM  Performed by: Raudel Nuñez CRNA  Authorized by: Anthony Roque MD     Intubation:     Induction:  Intravenous    Intubated:  Postinduction    Mask Ventilation:  Easy with oral airway    Attempts:  1    Attempted By:  CRNA    Method of Intubation:  Direct    Blade:  Lane 2    Laryngeal View Grade: Grade I - full view of cords      Difficult Airway Encountered?: No      Complications:  None    Airway Device:  Oral endotracheal tube    Airway Device Size:  7.0    Style/Cuff Inflation:  Cuffed    Inflation Amount (mL):  30    Tube secured:  22    Secured at:  The lips    Placement Verified By:  Capnometry    Complicating Factors:  None    Findings Post-Intubation:  BS equal bilateral and atraumatic/condition of teeth unchanged

## 2022-07-19 NOTE — ANESTHESIA PREPROCEDURE EVALUATION
07/19/2022  Leidy Hdez is a 89 y.o. frail female with intertrochanteric fracture of left femur after fall in her bathroom.  She does speak some english well, but seems to prefer Slovak.  Her son is here to assist with pre-op interview.  She denies any recent chest pain, and denies SOB.  She was doing well prior to this recent fall.  Pain is well controlled as long as she remains still.      Pre-op Assessment    I have reviewed the Patient Summary Reports.     I have reviewed the Nursing Notes. I have reviewed the NPO Status.   I have reviewed the Medications.     Review of Systems  Anesthesia Hx:  No problems with previous Anesthesia  Denies Family Hx of Anesthesia complications.   Denies Personal Hx of Anesthesia complications.   Cardiovascular:   Hypertension Valvular problems/Murmurs    Pulmonary:  Pulmonary Normal    Endocrine:   Hypothyroidism        Physical Exam  General: Cooperative, Alert and Oriented  Thin and frail in appearance.  She does answer appropriately.  Airway:  Mallampati: II   Mouth Opening: Normal  TM Distance: Normal  Tongue: Normal  Neck ROM: Normal ROM    Chest/Lungs:  Clear to auscultation, Normal Respiratory Rate    Heart:  Rate: Normal  Rhythm: Regular Rhythm        Anesthesia Plan  Type of Anesthesia, risks & benefits discussed:    Anesthesia Type: Gen ETT  Intra-op Monitoring Plan: Standard ASA Monitors  Post Op Pain Control Plan: multimodal analgesia  Induction:  IV  Airway Plan: Direct, Post-Induction  Informed Consent: Informed consent signed with the Patient representative and all parties understand the risks and agree with anesthesia plan.  All questions answered.   ASA Score: 3 Emergent  Day of Surgery Review of History & Physical: H&P Update referred to the surgeon/provider.    Ready For Surgery From Anesthesia Perspective.     .

## 2022-07-19 NOTE — CONSULTS
[facility] - Emergency Dept  Orthopedics  Consult Note    Patient Name: Leidy Hdez  MRN: [id number]  Admission Date: 7/18/2022  Hospital Length of Stay: 1 days  Attending Provider: Reyes Guzman Jr., MD  Primary Care Provider: Yoni Hale MD    Patient information was obtained from patient and ER records and her son Thom.    Consults  Subjective:     Principal Problem:<principal problem not specified>    Chief Complaint:   Chief Complaint   Patient presents with    Dizziness     Near-syncope, fall, and lt hip pain        HPI:  Patient is a 89-year-old female that lives at her home in [facility].  She fell in her bathroom last night and sustained a nondisplaced fracture of the left intertrochanteric femur.  She complains of left hip pain.  She has no other complaints.    No past medical history on file.    No past surgical history on file.    Review of patient's allergies indicates:   Allergen Reactions    Atropine     Diazepam     Promethazine        Current Facility-Administered Medications   Medication    heparin (porcine) injection 5,000 Units    melatonin tablet 6 mg    morphine injection 1 mg    ondansetron injection 4 mg    sodium chloride 0.9% flush 10 mL     Current Outpatient Medications   Medication Sig    atorvastatin (LIPITOR) 10 MG tablet Take 10 mg by mouth once daily.    hydrALAZINE (APRESOLINE) 50 MG tablet TAKE 1/2 (ONE-HALF) TABLET BY MOUTH TWICE DAILY. IF BLOOD PRESSURE AVERAGE IS GREATEER THAN 130/80 AFTER 2 DAYS, INCREASE TO 1 TABLET TWICE DAILY. IF BLOOD PRESSURE AVERAGE IS GREATER THAN 130/80 AFTER 2 DAYS, INCREASE TO 2 TABLETS TWICE DAILY    levothyroxine (SYNTHROID) 50 MCG tablet Take 50 mcg by mouth once daily.    lisinopriL (PRINIVIL,ZESTRIL) 20 MG tablet Take 20 mg by mouth 2 (two) times daily.    metoprolol succinate (TOPROL-XL) 50 MG 24 hr tablet Take 50 mg by mouth once daily.     Family History    None       Tobacco Use     "Smoking status: Not on file    Smokeless tobacco: Not on file   Substance and Sexual Activity    Alcohol use: Not on file    Drug use: Not on file    Sexual activity: Not on file     ROS  Objective:     Vital Signs (Most Recent):  Temp: 97.9 °F (36.6 °C) (07/19/22 0530)  Pulse: 69 (07/19/22 0707)  Resp: 16 (07/19/22 0707)  BP: 123/78 (07/19/22 0707)  SpO2: (!) 94 % (07/19/22 0707) Vital Signs (24h Range):  Temp:  [97.9 °F (36.6 °C)-98.6 °F (37 °C)] 97.9 °F (36.6 °C)  Pulse:  [64-82] 69  Resp:  [16-18] 16  SpO2:  [91 %-95 %] 94 %  BP: ()/(59-86) 123/78     Weight: 49.9 kg (110 lb)  Height: 5' 3" (160 cm)  Body mass index is 19.49 kg/m².    No intake or output data in the 24 hours ending 07/19/22 0730    Ortho/SPM Exam   Physical exam  General exam:  Well groomed, well nourished, no acute distress  Alert and oriented x3  HEENT:  Pupils are equal, round, and reactive to light, normocephalic, atraumatic  Cardiovascular:  S1 and S2 heard, no murmurs  Pulmonary:  Lungs clear to auscultation bilaterally  Gastrointestinal:  Positive bowel sounds, soft, nontender, nondistended    Left hip exam:  Externally rotated  Tender left hip with rotation  No noticeable swelling  No noticeable atrophy  No bruising  No wounds  2+ dorsal pedal pulse  2+ posterior tibial pulses  Intact sensation and distal motor function  No knee or ankle tenderness  No foot tenderness  Strong dorsiflexion and plantar flexion of the great and lesser toes as well as the ankle    Significant Labs: All pertinent labs within the past 24 hours have been reviewed.    Significant Imaging: X-Ray: I have reviewed all pertinent results/findings and my personal findings are:  Nondisplaced fracture left hip intertrochanteric femur    Assessment/Plan:     There are no hospital problems to display for this patient.      Thank you for your consult. Plan is take patient to the operating room for ORIF left hip intertrochanteric femur fracture.     Risks, " benefits, alternatives, and complications were explained to the patient and/or patient representative. They understand, agree, and want to proceed with the operation/ procedure.      Justus Matthew MD  Orthopedics  [facility] Orth[facility] - Emergency Dept

## 2022-07-19 NOTE — TRANSFER OF CARE
"Anesthesia Transfer of Care Note    Patient: Leidy Hdez    Procedure(s) Performed: Procedure(s) (LRB):  INSERTION, INTRAMEDULLARY VERNELL, FEMUR (Left)    Patient location: PACU    Anesthesia Type: general    Transport from OR: Transported from OR on room air with adequate spontaneous ventilation    Post pain: adequate analgesia    Post assessment: no apparent anesthetic complications    Post vital signs: stable    Level of consciousness: sedated and responds to stimulation    Nausea/Vomiting: no nausea/vomiting    Complications: none    Transfer of care protocol was followed      Last vitals:   Visit Vitals  /64   Pulse 64   Temp 36.6 °C (97.9 °F) (Oral)   Resp 18   Ht 5' 3" (1.6 m)   Wt 49.9 kg (110 lb)   SpO2 (!) 94%   Breastfeeding No   BMI 19.49 kg/m²     "

## 2022-07-19 NOTE — OP NOTE
[facility] Orthopa[facility] - Periop Services  General Surgery  Operative Note    SUMMARY     Date of Procedure: 7/19/2022     Procedure: Procedure(s) (LRB):  INSERTION, INTRAMEDULLARY VERNELL, FEMUR (Left)     ORIF left hip intertrochanteric femur fracture  Surgeon(s) and Role:     * Antonio Matthew MD - Primary    Assistant:  Galindo Braden, certified physician assistant who was necessary and essential for all aspects of the operation including patient positioning, fracture reduction, surgical exposure, fracture fixation and implantation, and wound closure.    Pre-Operative Diagnosis:  Nondisplaced intertrochanteric fracture of left femur, initial encounter for closed fracture [S72.142A]    Post-Operative Diagnosis: Post-Op Diagnosis Codes:     * nondisplaced intertrochanteric fracture of left femur, initial encounter for closed fracture [S72.142A]    Anesthesia: General    Operative Findings (including complications, if any):  Left hip intertrochanteric femur fracture    Description of Technical Procedures:  The patient is a 89-year-old female that fell last night in her bathroom at home.  She was brought to the emergency room by ambulance at [facility].  She was diagnosed with a nondisplaced fracture of the intertrochanteric region of her left hip.  I have seen and evaluated the patient and spoken with not only the patient but also her son Thom.  Risks, benefits, alternatives, and complications of operative and nonoperative treatment were explained to the patient and her son Thom.  They understand, agree, and want to proceed with the operation for an open reduction with internal fixation of the left hip intertrochanteric femur fracture.  Valid informed consent was obtained.  She was brought to the operating room placed supine on the operating room table and underwent general anesthesia.  Once secured on the fracture table the fracture was aligned appropriately and appropriate  visualization was seen with fluoroscopy.  The usual sterile ChloraPrep scrub and paint followed by sterile draping was performed.  And I have been shield drape was placed.  Utilizing fluoroscopy and incision was made 3 finger breaths proximal to the tip of the greater trochanter and the fascia was incised.  I then placed a guide pin at the starting point on the tip of the trochanter and under fluoroscopic guidance advanced this pin to the appropriate location within the femur proximally.  The core Reamer was then used to open up the insertion site.  I then placed a bead tipped guidewire in the shaft of the femur and then reamed distally with 13 mm and proximally with 15.5 mm.  The Munira gamma nail was then placed over the guidewire to the appropriate position and was confirmed to be appropriate radiographically.  The bead tipped guidewire was removed and then a 2nd incision was made and a guide pin was placed proximally for the lag screw.  I then measured reamed and placed the lag screw.  The set screw was engaged and then a 3rd incision was made distally and a distal locking screw was placed.  Multiplanar fluoroscopic images confirmed appropriate fracture reduction and fracture fixation.  The wounds were irrigated suction and closed with Vicryl suture  and staples.  Sterile dressings were applied and patient was taken to recovery room in stable condition.    Estimated Blood Loss (EBL): 50 mL           Implants:   Implant Name Type Inv. Item Serial No.  Lot No. LRB No. Used Action   Lag Screw, TI     MUNIRA [id number] Left 1 Implanted   GUIDE WIRE [id number] BALL TIP - [id number]  GUIDE WIRE [id number] BALL TIP  [employer]. [id number] Left 1 Implanted and Explanted   Reamer Shaft     MUNIRA [id number] Left 1 Implanted   GUIDEWIRE 3.2 X 450 - [id number]  GUIDEWIRE 3.2 X 450  [employer]. [id number] Left 1 Implanted and Explanted   KWIRE FIXATION STRL 3.6D803AC - [id number]  KWIRE FIXATION STRL  [id number]  [employer] TOMI. [id number] Left 1 Implanted and Explanted   NAIL GAMMA 130 DEG [id number] - [id number]  NAIL GAMMA 130 DEG [id number]  [employer] TOMI. [id number] Left 1 Implanted   SCREW LAG TITANIUM 10.5X95 - [id number]  SCREW LAG TITANIUM 10.5X95  [employer] TOMI. [id number] Left 1 Implanted   SCREW LOCKING T2 F/T 5X32.5MM - [id number]  SCREW LOCKING T2 F/T 5X32.5MM  [employer] TOMI. [id number] Left 1 Implanted       Specimens:   Specimen (24h ago, onward)            None                  Condition: Stable    Disposition: PACU - hemodynamically stable.

## 2022-07-20 PROBLEM — R59.9 ADENOPATHY: Status: ACTIVE | Noted: 2022-07-20

## 2022-07-20 PROBLEM — N63.31 MASS OF AXILLARY TAIL OF RIGHT BREAST: Status: ACTIVE | Noted: 2022-07-20

## 2022-07-20 PROBLEM — N63.0 BREAST MASS: Status: ACTIVE | Noted: 2022-07-20

## 2022-07-20 LAB
ANION GAP SERPL CALC-SCNC: 10 MEQ/L
BUN SERPL-MCNC: 22.3 MG/DL (ref 9.8–20.1)
CALCIUM SERPL-MCNC: 8.8 MG/DL (ref 8.4–10.2)
CHLORIDE SERPL-SCNC: 104 MMOL/L (ref 98–111)
CO2 SERPL-SCNC: 17 MMOL/L (ref 23–31)
CREAT SERPL-MCNC: 1.24 MG/DL (ref 0.55–1.02)
CREAT/UREA NIT SERPL: 18
ERYTHROCYTE [DISTWIDTH] IN BLOOD BY AUTOMATED COUNT: 13.6 % (ref 11.5–17)
GLUCOSE SERPL-MCNC: 139 MG/DL (ref 75–121)
HCT VFR BLD AUTO: 31.2 % (ref 37–47)
HGB BLD-MCNC: 10.1 GM/DL (ref 12–16)
MCH RBC QN AUTO: 29.5 PG (ref 27–31)
MCHC RBC AUTO-ENTMCNC: 32.4 MG/DL (ref 33–36)
MCV RBC AUTO: 91.2 FL (ref 80–94)
NRBC BLD AUTO-RTO: 0 %
PLATELET # BLD AUTO: 166 X10(3)/MCL (ref 130–400)
PMV BLD AUTO: 11.1 FL (ref 7.4–10.4)
POTASSIUM SERPL-SCNC: 5 MMOL/L (ref 3.5–5.1)
RBC # BLD AUTO: 3.42 X10(6)/MCL (ref 4.2–5.4)
SODIUM SERPL-SCNC: 131 MMOL/L (ref 132–146)
WBC # SPEC AUTO: 10.9 X10(3)/MCL (ref 4.5–11.5)

## 2022-07-20 PROCEDURE — 97162 PT EVAL MOD COMPLEX 30 MIN: CPT

## 2022-07-20 PROCEDURE — 99222 PR INITIAL HOSPITAL CARE,LEVL II: ICD-10-PCS | Mod: ,,, | Performed by: PHYSICIAN ASSISTANT

## 2022-07-20 PROCEDURE — 94799 UNLISTED PULMONARY SVC/PX: CPT

## 2022-07-20 PROCEDURE — 27000221 HC OXYGEN, UP TO 24 HOURS

## 2022-07-20 PROCEDURE — 80048 BASIC METABOLIC PNL TOTAL CA: CPT | Performed by: INTERNAL MEDICINE

## 2022-07-20 PROCEDURE — 99222 1ST HOSP IP/OBS MODERATE 55: CPT | Mod: ,,, | Performed by: PHYSICIAN ASSISTANT

## 2022-07-20 PROCEDURE — 63600175 PHARM REV CODE 636 W HCPCS: Performed by: SPECIALIST

## 2022-07-20 PROCEDURE — 97166 OT EVAL MOD COMPLEX 45 MIN: CPT

## 2022-07-20 PROCEDURE — 85027 COMPLETE CBC AUTOMATED: CPT | Performed by: INTERNAL MEDICINE

## 2022-07-20 PROCEDURE — 11000001 HC ACUTE MED/SURG PRIVATE ROOM

## 2022-07-20 PROCEDURE — 25000003 PHARM REV CODE 250: Performed by: SPECIALIST

## 2022-07-20 PROCEDURE — 63600175 PHARM REV CODE 636 W HCPCS: Performed by: NURSE PRACTITIONER

## 2022-07-20 PROCEDURE — 36415 COLL VENOUS BLD VENIPUNCTURE: CPT | Performed by: INTERNAL MEDICINE

## 2022-07-20 RX ADMIN — ACETAMINOPHEN 500 MG: 500 TABLET, FILM COATED ORAL at 05:07

## 2022-07-20 RX ADMIN — ENOXAPARIN SODIUM 30 MG: 30 INJECTION SUBCUTANEOUS at 05:07

## 2022-07-20 RX ADMIN — KETOROLAC TROMETHAMINE 15 MG: 30 INJECTION, SOLUTION INTRAMUSCULAR; INTRAVENOUS at 05:07

## 2022-07-20 RX ADMIN — METOCLOPRAMIDE 10 MG: 5 INJECTION, SOLUTION INTRAMUSCULAR; INTRAVENOUS at 12:07

## 2022-07-20 RX ADMIN — LISINOPRIL 20 MG: 20 TABLET ORAL at 09:07

## 2022-07-20 RX ADMIN — HYDRALAZINE HYDROCHLORIDE 25 MG: 25 TABLET ORAL at 09:07

## 2022-07-20 RX ADMIN — ACETAMINOPHEN 500 MG: 500 TABLET, FILM COATED ORAL at 09:07

## 2022-07-20 RX ADMIN — CEFAZOLIN SODIUM 2 G: 2 SOLUTION INTRAVENOUS at 05:07

## 2022-07-20 RX ADMIN — METOPROLOL SUCCINATE 50 MG: 50 TABLET, EXTENDED RELEASE ORAL at 09:07

## 2022-07-20 RX ADMIN — FAMOTIDINE 20 MG: 20 TABLET, FILM COATED ORAL at 09:07

## 2022-07-20 RX ADMIN — POLYETHYLENE GLYCOL 3350 17 G: 17 POWDER, FOR SOLUTION ORAL at 09:07

## 2022-07-20 RX ADMIN — ACETAMINOPHEN 500 MG: 500 TABLET, FILM COATED ORAL at 12:07

## 2022-07-20 RX ADMIN — LEVOTHYROXINE SODIUM 50 MCG: 50 TABLET ORAL at 05:07

## 2022-07-20 RX ADMIN — METOCLOPRAMIDE 10 MG: 5 INJECTION, SOLUTION INTRAMUSCULAR; INTRAVENOUS at 05:07

## 2022-07-20 RX ADMIN — TRAMADOL HYDROCHLORIDE 50 MG: 50 TABLET, COATED ORAL at 01:07

## 2022-07-20 NOTE — PT/OT/SLP EVAL
Occupational Therapy   Evaluation    Name: Leidy Hdez  MRN: [id number]  Admitting Diagnosis:  Nondisplaced intertrochanteric fracture of left femur, init  Recent Surgery: Procedure(s) (LRB):  INSERTION, INTRAMEDULLARY VERNELL, FEMUR (Left) 1 Day Post-Op    Recommendations:     Discharge Recommendations: rehabilitation facility, home with home health (Home c responsibe caregiver)  Discharge Equipment Recommendations:  bedside commode, walker, rolling  Barriers to discharge:  Other (Comment) (Severity of deficits)    Assessment:     Leidy Hdez is a 89 y.o. female with a medical diagnosis of Nondisplaced intertrochanteric fracture of left femur, init. Performance deficits affecting function: weakness, impaired endurance, impaired balance, impaired self care skills, impaired functional mobility.  Pt's son reported he has been staying c her as her caregiver for the past ~8 years. Reported a recent decline in function 2/2 the loss of her dog. Pt limited by generalized weakness, pain c mobility, and decreased endurance. Would benefit from IPR vs Home c HH and 24hr family assist at d/c pending progress c therapy.     Rehab Prognosis: Good; patient would benefit from acute skilled OT services to address these deficits and reach maximum level of function.       Plan:     Patient to be seen 5 x/week, daily to address the above listed problems via self-care/home management, therapeutic activities, therapeutic exercises  · Plan of Care Expires: 08/03/22  · Plan of Care Reviewed with:      Subjective     Chief Complaint: None   Patient/Family Comments/goals: To get stronger     Occupational Profile:  Living Environment: Pt lives in a single level home c her son who is her caregiver. She has a tub c a shower chair.   Previous level of function: Pt's son reported pt was IND but would occasionally ask for assistance c ADLs and mobility whenever she was feeling dizzy.   Equipment Used at Home:  shower chair  Assistance upon  Discharge: Pt's son will be able to assist at d/c.     Pain/Comfort:  · Pain Rating 1: 0/10    Patients cultural, spiritual, Druze conflicts given the current situation: no    Objective:     Communicated with: RN prior to session.  Patient found supine with SCD, pressure relief boots upon OT entry to room.    General Precautions: Standard, fall (Pt's primary language is Filipino but she is able to speak some english\)   Orthopedic Precautions:LLE weight bearing as tolerated   Braces: N/A  Respiratory Status: Nasal cannula, flow 2 L/min    Occupational Performance:    Bed Mobility:    · Patient completed Supine to Sit with minimum assistance    Functional Mobility/Transfers:  · Patient completed Sit <> Stand Transfer with minimum assistance  with  rolling walker   · Patient completed Bed <> Chair Transfer using Step Transfer technique with minimum assistance with rolling walker  · Functional Mobility: Pt ambulated ~5 ft to bedside chair using RW c Min A.     Activities of Daily Living:  · Lower Body Dressing: maximal assistance .    Cognitive/Visual Perceptual:  Cognitive/Psychosocial Skills:     -       Follows Commands/attention:Follows multistep  commands  -       Mood/Affect/Coping skills/emotional control: Cooperative and Pleasant    Physical Exam:  Upper Extremity Strength:    -       Right Upper Extremity: WFL  -       Left Upper Extremity: WFL    Treatment & Education:  Educated pt and son on OT POC- verbalized understanding.   Education:    Patient left up in chair with all lines intact, call button in reach and RN and son present    GOALS:   Multidisciplinary Problems     Occupational Therapy Goals        Problem: Occupational Therapy    Goal Priority Disciplines Outcome Interventions   Occupational Therapy Goal     OT, PT/OT Ongoing, Progressing    Description: Goals to be met by: 7/3  Patient will increase functional independence with ADLs by performing:    LE Dressing with Minimal  Assistance.  Grooming while standing at sink with Stand-by Assistance.  Toileting from toilet with Stand-by Assistance for hygiene and clothing management.   Toilet transfer to toilet with Stand-by Assistance.                     History:     No past medical history on file.    No past surgical history on file.    Time Tracking:     OT Date of Treatment: 07/20/22  OT Start Time: 0857  OT Stop Time: 0912  OT Total Time (min): 15 min    Billable Minutes:Evaluation Moderate complexity     7/20/2022

## 2022-07-20 NOTE — PLAN OF CARE
Problem: Physical Therapy  Goal: Physical Therapy Goal  Description: Goals to be met by: 2022     Patient will increase functional independence with mobility by performin. Supine to sit with Stand-by Assistance  2. Sit to supine with Stand-by Assistance  3. Sit to stand transfer with Stand-by Assistance  4. Gait  x 300 feet with Supervision using Rolling Walker.     Outcome: Ongoing, Progressing

## 2022-07-20 NOTE — PROGRESS NOTES
"No acute events overnight.  Pain controlled.  Resting in bed.    Vital Signs  Temp: 97.4 °F (36.3 °C)  Temp src: Oral  Pulse: 75  Heart Rate Source: Monitor  Resp: 16  SpO2: (!) 93 %  Pulse Oximetry Type: Continuous  Flow (L/min): 2  O2 Device (Oxygen Therapy): nasal cannula  BP: 108/67  BP Location: Right arm  BP Method: Automatic  Patient Position: Lying  Height and Weight  Height: 5' 3" (160 cm)  Height Method: Estimated  Weight: 49.9 kg (110 lb)  Weight Method: Bed Scale  Dosing Weight: 47.3 kg (104 lb 4.4 oz)  BSA (Calculated - sq m): 1.49 sq meters  BMI (Calculated): 19.5  Weight in (lb) to have BMI = 25: 140.8]    +FHL/EHL  Brisk capillary refill distally  Dressing c/d/i  Sensation intact to light touch distally    Recent Lab Results       07/20/22  0538   07/19/22  1445        Anion Gap 10.0         BUN 22.3         BUN/CREAT RATIO 18         Calcium 8.8         Chloride 104         CO2 17         Creatinine 1.24         eGFR if non African American 43         Glucose 139         Hematocrit 31.2   36.3       Hemoglobin 10.1   11.6       MCH 29.5         MCHC 32.4         MCV 91.2         MPV 11.1         nRBC 0.0         Platelets 166         Potassium 5.0         RBC 3.42         RDW 13.6         Sodium 131         WBC 10.9               A/P:  Status post ORIF left hip intertrochanteric femur fracture  Overall patient doing well.  Therapy for mobility and ambulation.  Lovenox for DVT PPx  Discussed findings on CT scan of her chest of possible right breast malignancy.  I discussed this with her son Thom and the patient.  I explained that Dr. Hale is her primary care physician and will address this.    "

## 2022-07-20 NOTE — PLAN OF CARE
Problem: Adult Inpatient Plan of Care  Goal: Plan of Care Review  Outcome: Ongoing, Progressing  Flowsheets (Taken 7/19/2022 2343)  Plan of Care Reviewed With: patient  Goal: Patient-Specific Goal (Individualized)  Outcome: Ongoing, Progressing  Flowsheets (Taken 7/19/2022 2343)  Patient-Specific Goals (Include Timeframe): to be able to go home  Goal: Absence of Hospital-Acquired Illness or Injury  Outcome: Ongoing, Progressing  Intervention: Prevent Skin Injury  Flowsheets (Taken 7/19/2022 2343)  Skin Protection:   transparent dressing maintained   adhesive use limited

## 2022-07-20 NOTE — PROGRESS NOTES
PATIENT NAME:       MATTHEW JOHNSON  YOB: 1925  CSN:                [id number]   MRN:                [id number]  ADMIT DATE:         07/18/2022 16:47:00  PHYSICIAN:          Yoni Hale MD                            PROGRESS NOTE    DATE:      SUBJECTIVE:  Patient, today, she is set up for surgery.  No shortness of breath.    No chest pain.  No palpitations.  No fever, chills, or other problems.    REVIEW OF SYSTEMS:  X12 as above.    PHYSICAL EXAMINATION:  VITAL SIGNS:  Blood pressure 123/78, pulse 69, temp 97.9.  GENERAL APPEARANCE:  She is alert, in no acute distress.  HEART:  Regular rhythm and rate with a murmur.  LUNGS:  Clear.  ABDOMEN:  Soft, nontender.  EXTREMITIES:  No clubbing, cyanosis, or edema.  NEUROLOGICAL:  Nonfocal.    LABS:  H and H 11.6, 36.3.    IMPRESSION:    1. Left intertrochanteric fracture after fall.  2. Hypertension.  3. Dyslipidemia.  4. Hypothyroidism.  5. Right breast mass.    PLAN:    1. Patient will have surgery.  2. Will continue with DVT prophylaxis.  3. Will continue with current medication.    ______________________________  Yoni Hale MD    KAVITA/AQS  DD:  07/20/2022  Time:  08:42AM  DT:  07/20/2022  Time:  09:22AM  Job #:  [id number]      PROGRESS NOTE

## 2022-07-20 NOTE — PROGRESS NOTES
"Nutrition   Progress Note      Recommendations:  - continue soft and bite sized diet as tolerated      Reason for Evaluation:  Identified at risk by screening criteria    Diagnosis:    1. Nondisplaced intertrochanteric fracture of left femur, init    2. Trauma    3. Hypoxemia    4. Dizziness    5. Pre-op evaluation    6. Displaced intertrochanteric fracture of left femur, initial encounter for closed fracture        Relevant Medical History:  No past medical history on file.      Nutrition Diet History:    Factors affecting nutritional intake: none identified at this time    Food / Sikhism / Culture Preferences:  none      Nutrition Prescription Ordered:    Current Diet Order: soft and bite sized    Appetite:  Fair (50% - 75% po intake)    PO intake: 50 - 75 %      Labs / Medications / Procedures:    Nutrition Related Medications: famotidine, miralax, docusate    Nutrition Related Labs:  7/20: Na 131, BUN 22.3, Crea1.24, Glu 139      Anthropometrics:  Height: 5' 3" (1.6 m)  Admit Weight:  Weight: 47.3 kg (104 lb 4.4 oz)  Latest Weight:  49.9 kg (110 lb)    Wt Readings from Last 5 Encounters:   07/19/22 49.9 kg (110 lb)     IBW: 52kg  %IBW: 96%  UBW: 49.9kg  %Weight Change: none  Body mass index is 19.49 kg/m².  BMI classification:  Underweight (BMI less than 22 if > 65 years of age)      Nutrition Narrative:  7/20: pt tolerating diet, sleeping, no weight loss or appetite loss noted on nursing screen, no family in room    Monitoring and Evaluation:    Nutrition Monitoring and Evaluation:  food and beverage intake    Nutrition Risk:  Level of Nutrition Risk:  Low  Frequency of Follow up:  Dietitian will f/up within 7 days.            "

## 2022-07-20 NOTE — PHYSICIAN QUERY
PT Name: Leidy Hdez  MR #: [id number]    DOCUMENTATION CLARIFICATION     CDS/: Erin Nettles RN, CDS   Contact Information: [email or web address]  This form is a permanent document in the medical record.    Query Date: July 20, 2022      By submitting this query, we are merely seeking further clarification of documentation.  Please utilize your independent clinical judgment when addressing the question(s) below.    The Medical Record reflects the following:    Clinical Information Location in Medical Record   PO intake: 50 - 75 %  Body mass index is 19.49 kg/m².  BMI classification:  Underweight (BMI less than 22 if > 65 years of age)    hypertension ,valvular disease. hypothyroidism and osteoarthritis.   Mild dehydration   Mild postoperative anemia. Status post fall with a left intertrochanteric fracture.    INSERTION, INTRAMEDULLARY VERNELL, FEMUR (Left)     EBL 50 cc Nutrition note 7/20        Internal Medicine 7/20        Orthopedic surgery 7/19       Please clarify/confirm the Consultants diagnosis of __Underweight_________:     [  x] Diagnosis ruled in   [  ] Diagnosis ruled out   [  ] Other diagnosis (please specify): _____________________________   [  ] Clinically undetermined

## 2022-07-20 NOTE — H&P
[facility]                       [address] [id number]    PATIENT NAME:       MATTHEW JOHNSON  YOB: 1925  CSN:                [id number]   MRN:                [id number]  ADMIT DATE:         07/18/2022 16:47:00  PHYSICIAN:          Yoni Hale MD                        HISTORY AND PHYSICAL      HISTORY OF PRESENT ILLNESS:  This is a 89-year-old  [place]   female.  She was in her usual state of health, and she was taking a shower when   she put a towel on the counter, and it slipped, and she fell on the  side.    She sustained a nondisplaced fracture to the left intertrochanteric femur.  She   was taken to the emergency room.  She was in pain.  She denied any syncope.  No   chest pain, no palpitations or other problems.    REVIEW OF SYSTEMS:  X12 as above.    PAST MEDICAL HISTORY:  Remarkable for hypertension and valvular disease.  She   has hypothyroidism and osteoarthritis.  She has some diastolic dysfunction, mild   mitral regurgitation, mild aortic regurgitation with normal EF of 55% to 60%.    SURGICAL HISTORY:  Unremarkable.    SOCIAL HISTORY:  She does not smoke, drink in excess, or use any illegal drugs.    FAMILY HISTORY:  Noncontributory.    ALLERGIES:  INCLUDE ATROPINE, VALIUM, AND PROMETHAZINE.     MEDICATIONS:  Please see MARs.    PHYSICAL EXAMINATION:  VITAL SIGNS:  Blood pressure 148/86, pulse 74, temp 98.6.  GENERAL APPEARANCE:  She is alert, in no acute distress, in pain.  HEENT:  Normocephalic, atraumatic.  PERRLA.  EOMI.  NECK:  Supple.  No JVD.  No bruits.  HEART:  She has a regular rhythm and rate.  There is a 2/6 systolic ejection   murmur, increased in the aortic area.  LUNGS:  Clear.  ABDOMEN:  Soft, nontender.  Bowel sounds positive and normal.  GENITAL/RECTAL:  No discharge.  Normal for age.  EXTREMITIES:  There is no clubbing, cyanosis, or edema.  There is  significant   tenderness in the  hip area.  NEUROLOGIC:  Nonfocal.  Cranial nerves 2-12 are intact.  She is oriented x3.    LABS AND IMAGING:  X-rays show a hazy opacity in the right lower lung.  X-ray of   the lt hip shows an intertrochanteric fracture.  Labs are remarkable for a   white cell count 13.  H and H 12.6 and 38.4.  Chem 12 shows a BUN of 19.9,   creatinine 1, albumin 3.2.  .  Troponin 0.016.  Thyroid 2.48.  Urine   negative.  CT of the pelvis shows the lt intertrochanteric fracture in the hip.    CT of the chest shows no PE.  She has a lobulated mass in the right breast and   lobulated right perihilar soft tissue thickening suspicious for metastasis.    EKG shows normal sinus rhythm, some T-wave changes.    IMPRESSION:    1. Status post fall with a lt hip fracture.  2. She has breast mass in the right.  3. She has some right perihilar soft tissue thickening.  4. She has hypothyroidism.  5. Hypertension.  6. Osteoarthritis.  7. Dyslipidemia.    PLAN:    1. Patient will have hip surgery.  2. Eventually, will work up her breast mass, as well as the other abnormalities.  3. Will keep her on some DVT prophylaxis.        ______________________________  MD KAVITA Arenas/WANG  DD:  07/20/2022  Time:  08:42AM  DT:  07/20/2022  Time:  09:17AM  Job #:  [id number]      HISTORY AND PHYSICAL

## 2022-07-20 NOTE — PLAN OF CARE
Reviewed chart. Therapy recommends rehab. Pt has breast mass that she will be needing follow up after rehab. Spoke with son Thom who is at bedside and lives with pt. He confirms [facility] as he is familiar with this location stating it is where pt had her surgery. Pt did have a hip fx.   [facility]

## 2022-07-20 NOTE — CONSULTS
[facility]  Breast Surgical Oncology  Inpatient Consult - H&P        Referring Provider: Yoni Hale MD   PCP: Yoni Hale MD   Care Team:  Orthopedist: Dr. Antonio Matthew     Chief Complaint:  Right breast mass present for over 1 year     Subjective:      HPI:  Leidy Hdez is a pleasant 89 y.o. female who was admitted to the hospital recently after a fall. She sustained a nondisplaced fracture to the left femur and is s/p ORIF left hip intertrochanteric femure fracture. Incidentally, a right breast mass was noted on her CT chest scan. She has felt this palpable mass since the beginning of COVID. It is occasionally tender. A limited history was obtained from her son who reports that she also has a sister who was diagnosed with breast cancer in her 80s. Denies any other family members with cancer. He does report that when she was much younger she did have a breast biopsy/possible procedure performed which was benign.     Imagin. CTA Chest - There is a lobulated mass in the right breast measuring 3.7 x 2.6 cm. There is a right perihilar area of soft tissue thickening measuring approximately 3.3 cm suspicious for malignancy. There is enlarged paratracheal, prevascular, and subcarinal lymphadenopathy.  A representative subcarinal lymph node measures 3.2 x 2.2 cm. There is no lytic or blastic osseous lesion.      Pathology:  None      Family History:  Sister - breast cancer (80s)     Patient History:  No past medical history on file.     There is no height or weight on file to calculate BMI.            Past Surgical History:   Procedure Laterality Date    INTRAMEDULLARY RODDING OF FEMUR Left 2022     Procedure: INSERTION, INTRAMEDULLARY VERNELL, FEMUR;  Surgeon: Antonio Matthew MD;  [facility];  Service: Orthopedics;  Laterality: Left;         Social History              Socioeconomic History    Marital status:                There is no immunization history on file for  this patient.     Medications/Allergies:  No current facility-administered medications for this encounter.  No current outpatient medications on file.     Facility-Administered Medications Ordered in Other Encounters:     0.9%  NaCl infusion, , Intravenous, Continuous, Antonio Matthew MD, Last Rate: 75 mL/hr at 07/19/22 2000, Rate Verify at 07/19/22 2000    acetaminophen tablet 500 mg, 500 mg, Oral, 6 times per day, Antonio Matthew MD, 500 mg at 07/20/22 1213    aluminum-magnesium hydroxide-simethicone 200-200-20 mg/5 mL suspension 30 mL, 30 mL, Oral, Q6H PRN, Antonio Matthew MD    [START ON 7/22/2022] bisacodyL suppository 10 mg, 10 mg, Rectal, Daily, Antonio Matthew MD    calcium carbonate 200 mg calcium (500 mg) chewable tablet 500 mg, 500 mg, Oral, TID PRN, Antonio Matthew MD    diphenhydrAMINE injection 25 mg, 25 mg, Intravenous, Q6H PRN, Anthony Roque MD    enoxaparin injection 30 mg, 30 mg, Subcutaneous, Daily, RAMOS Lal    famotidine tablet 20 mg, 20 mg, Oral, Daily, Antonio Matthew MD, 20 mg at 07/20/22 0914    hydrALAZINE tablet 25 mg, 25 mg, Oral, Q12H, Antonio Matthew MD, 25 mg at 07/20/22 0914    HYDROmorphone (PF) injection 0.2 mg, 0.2 mg, Intravenous, Q5 Min PRN, Anthony Roque MD    lactulose 20 gram/30 mL solution Soln 20 g, 20 g, Oral, Q6H PRN, Antonio Matthew MD    levothyroxine tablet 50 mcg, 50 mcg, Oral, Daily, Antonio Matthew MD, 50 mcg at 07/20/22 0512    lisinopriL tablet 20 mg, 20 mg, Oral, BID, Antonio Matthew MD, 20 mg at 07/20/22 0914    melatonin tablet 6 mg, 6 mg, Oral, Nightly PRN, Antonio Matthew MD    methocarbamoL tablet 500 mg, 500 mg, Oral, Q8H PRN, Antonio Matthew MD, 500 mg at 07/19/22 2125    metoprolol succinate (TOPROL-XL) 24 hr tablet 50 mg, 50 mg, Oral, Daily, Antonio Matthew MD, 50 mg at 07/20/22 0914    morphine injection 2 mg, 2 mg, Intravenous, Q3H PRN, Antonio Matthew MD    ondansetron injection 4 mg, 4 mg, Intravenous, Daily PRN, Anthony Roque MD    ondansetron injection  4 mg, 4 mg, Intravenous, Q6H PRN, Antonio Matthew MD    polyethylene glycol packet 17 g, 17 g, Oral, BID, Antonio Matthew MD, 17 g at 07/20/22 0914    senna-docusate 8.6-50 mg per tablet 2 tablet, 2 tablet, Oral, QHS, Antonio Matthew MD, 2 tablet at 07/19/22 2106    traMADoL tablet 50 mg, 50 mg, Oral, Q4H PRN, Antonio Matthew MD, 50 mg at 07/20/22 1327           Review of patient's allergies indicates:   Allergen Reactions    Atropine      Diazepam      Promethazine           Review of Systems:  Pertinent items are noted in HPI.     Objective:      Vitals:  There were no vitals filed for this visit.     Physical Exam:  General: The patient is awake, alert and oriented times three. The patient is well nourished and in no acute distress.  Neck: The neck is supple. The thyroid is not enlarged.  Musculoskeletal: The patient has a normal range of motion of her bilateral upper extremities.  Chest: Examination of the chest wall fails to reveal any obvious abnormalities. Nonlabored breathing, symmetric expansion.  Breast:  Right: Examination of right breast fails to reveals a dominant nodular hard mass in the UOQ/axillary tail of the right breast. In the axilla, there is also a large fixed hard mass. The nipple is everted without evidence of discharge. There is no skin dimpling with movement of the pectoralis. There are no significant skin changes overlying the breast.   Left: Examination of the left breast fails to reveal any dominant masses or areas of significant focal nodularity. The nipple is everted without evidence of discharge. There is no skin dimpling with movement of the pectoralis. There are no significant skin changes overlying the breast.  Abdomen: The abdomen is soft, flat, nontender and nondistended.  Integumentary: no rashes or skin lesions present  Neurologic: cranial nerves intact, no signs of peripheral neurological deficit, motor/sensory function intact        Assessment and Plan:       Right Breast:  changes  listed above are highly suspicious for breast cancer..  Left Breast:  normal          Patient Active Problem List   Diagnosis    Nondisplaced intertrochanteric fracture of left femur, init    Mass of axillary tail of right breast    Adenopathy         Plan:         I discussed findings on CT Chest and findings on physical exam today. I discussed that her symptoms are most consistent with a breast malignancy and that I am able to palpate a large mass in the breast and also where the lymph nodes are present.     There are no skin findings at this time today that indicate inpatient punch biopsy.      Once patient is discharged from the hospital, I recommend outpatient work up of masses with a BL DG MG and US to evaluate the findings concerning for malignancy. I discussed that she will likely need a biopsy. I will have the breast clinic imaging coordinator call patient to set up these appointments.     After release from hospital, outpatient work-up, biopsy, she will follow up clinically and we can direct treatment accordingly.              All of their questions were answered. Her and her family were advised to call if she develops any questions or concerns.     Codi Baca PA-C      Agree with the above written history and physical, as well as assessment and plan.      This patient was discussed with Dr. Antonio Matthew, Dr. Almaz Kelley, and Dr. Yoni Hale. She states she had the breast mass for at least a year. She recently had undergone surgery for hip fracture. At this time, recommendation is for out patient work-up.       All of questions were answered    Marisol Humphrey MD  Breast Surgical Oncology

## 2022-07-20 NOTE — PLAN OF CARE
Problem: Occupational Therapy  Goal: Occupational Therapy Goal  Description: Goals to be met by: 7/3  Patient will increase functional independence with ADLs by performing:    LE Dressing with Minimal Assistance.  Grooming while standing at sink with Stand-by Assistance.  Toileting from toilet with Stand-by Assistance for hygiene and clothing management.   Toilet transfer to toilet with Stand-by Assistance.    Outcome: Ongoing, Progressing

## 2022-07-20 NOTE — PT/OT/SLP EVAL
Physical Therapy Evaluation    Patient Name:  Leidy Hdez   MRN:  [id number]    Recommendations:     Discharge Recommendations:  rehabilitation facility   Discharge Equipment Recommendations: walker, rolling   Barriers to discharge: acuity of illness    Assessment:     Leidy Hdez is a 89 y.o. female admitted with a medical diagnosis of Nondisplaced intertrochanteric fracture of left femur, init.  She presents with the following impairments/functional limitations:  weakness, impaired endurance, impaired functional mobility, decreased lower extremity function, gait instability, pain, orthopedic precautions .    Rehab Prognosis: Good; patient would benefit from acute skilled PT services to address these deficits and reach maximum level of function.    Recent Surgery: Procedure(s) (LRB):  INSERTION, INTRAMEDULLARY VERNELL, FEMUR (Left) 1 Day Post-Op    Plan:     During this hospitalization, patient to be seen daily to BID to address the identified rehab impairments via gait training, therapeutic activities, therapeutic exercises, neuromuscular re-education and progress toward the following goals:    · Plan of Care Expires:  08/19/22    Subjective     Chief Complaint: pain  Patient/Family Comments/goals: to be independent  Pain/Comfort:  · Pain Rating 1: 4/10  · Location - Side 1: Left  · Location 1: leg  · Pain Addressed 1: Nurse notified, Reposition    Patients cultural, spiritual, Orthodoxy conflicts given the current situation: no    Living Environment:  Pt lives w/ her son in a SL home, no steps to enter.   Prior to admission, patients level of function was independent.  Equipment used at home: none.  DME owned (not currently used): none.  Upon discharge, patient will have assistance from son.    Objective:     Communicated with RN prior to session.  Patient found HOB elevated with peripheral IV, oxygen  upon PT entry to room.    General Precautions: Standard,     Orthopedic Precautions:LLE weight bearing  as tolerated   Braces: N/A  Respiratory Status: Nasal cannula, flow 2 L/min    Exams:  · Cognitive Exam:  Patient is oriented to Person, Place, Time and Situation    Functional Mobility:  · Bed Mobility:     · Supine to Sit: minimum assistance  · Transfers:     · Sit to Stand:  minimum assistance with rolling walker  · Gait: pt demo'd a slow step through gt pattern w/ short step length bilat x 10 ft w/ use of RW and Suyapa    AM-PAC 6 CLICK MOBILITY  Total Score:16     Patient left up in chair with all lines intact, call button in reach and RN notified.    GOALS:   Multidisciplinary Problems     Physical Therapy Goals        Problem: Physical Therapy    Goal Priority Disciplines Outcome Goal Variances Interventions   Physical Therapy Goal     PT, PT/OT Ongoing, Progressing     Description: Goals to be met by: 2022     Patient will increase functional independence with mobility by performin. Supine to sit with Stand-by Assistance  2. Sit to supine with Stand-by Assistance  3. Sit to stand transfer with Stand-by Assistance  4. Gait  x 300 feet with Supervision using Rolling Walker.                      History:     No past medical history on file.    Past Surgical History:   Procedure Laterality Date    INTRAMEDULLARY RODDING OF FEMUR Left 2022    Procedure: INSERTION, INTRAMEDULLARY VERNELL, FEMUR;  Surgeon: Antonio Matthew MD;  Location: [facility];  Service: Orthopedics;  Laterality: Left;       Time Tracking:     PT Received On: 22  PT Start Time: 857     PT Stop Time: 913  PT Total Time (min): 16 min     Billable Minutes: Evaluation 16 mins      2022

## 2022-07-21 LAB
ANION GAP SERPL CALC-SCNC: 5 MEQ/L
BUN SERPL-MCNC: 23.5 MG/DL (ref 9.8–20.1)
CALCIUM SERPL-MCNC: 8.2 MG/DL (ref 8.4–10.2)
CHLORIDE SERPL-SCNC: 106 MMOL/L (ref 98–111)
CLOSTRIDIUM DIFFICILE GDH ANTIGEN (OHS): NEGATIVE
CLOSTRIDIUM DIFFICILE TOXIN A/B (OHS): NEGATIVE
CO2 SERPL-SCNC: 19 MMOL/L (ref 23–31)
CREAT SERPL-MCNC: 0.96 MG/DL (ref 0.55–1.02)
CREAT/UREA NIT SERPL: 24
ERYTHROCYTE [DISTWIDTH] IN BLOOD BY AUTOMATED COUNT: 13.8 % (ref 11.5–17)
GLUCOSE SERPL-MCNC: 104 MG/DL (ref 75–121)
HCT VFR BLD AUTO: 30 % (ref 37–47)
HGB BLD-MCNC: 9.7 GM/DL (ref 12–16)
MCH RBC QN AUTO: 29.5 PG (ref 27–31)
MCHC RBC AUTO-ENTMCNC: 32.3 MG/DL (ref 33–36)
MCV RBC AUTO: 91.2 FL (ref 80–94)
NRBC BLD AUTO-RTO: 0 %
PLATELET # BLD AUTO: 210 X10(3)/MCL (ref 130–400)
PMV BLD AUTO: 10.4 FL (ref 7.4–10.4)
POTASSIUM SERPL-SCNC: 4.8 MMOL/L (ref 3.5–5.1)
RBC # BLD AUTO: 3.29 X10(6)/MCL (ref 4.2–5.4)
SARS-COV-2 RDRP RESP QL NAA+PROBE: NEGATIVE
SODIUM SERPL-SCNC: 130 MMOL/L (ref 132–146)
WBC # SPEC AUTO: 11.5 X10(3)/MCL (ref 4.5–11.5)

## 2022-07-21 PROCEDURE — 99223 1ST HOSP IP/OBS HIGH 75: CPT | Mod: ,,, | Performed by: INTERNAL MEDICINE

## 2022-07-21 PROCEDURE — 85027 COMPLETE CBC AUTOMATED: CPT | Performed by: INTERNAL MEDICINE

## 2022-07-21 PROCEDURE — 25000003 PHARM REV CODE 250: Performed by: INTERNAL MEDICINE

## 2022-07-21 PROCEDURE — 94761 N-INVAS EAR/PLS OXIMETRY MLT: CPT

## 2022-07-21 PROCEDURE — 94799 UNLISTED PULMONARY SVC/PX: CPT

## 2022-07-21 PROCEDURE — 11000001 HC ACUTE MED/SURG PRIVATE ROOM

## 2022-07-21 PROCEDURE — 87635 SARS-COV-2 COVID-19 AMP PRB: CPT | Performed by: INTERNAL MEDICINE

## 2022-07-21 PROCEDURE — 36415 COLL VENOUS BLD VENIPUNCTURE: CPT | Performed by: INTERNAL MEDICINE

## 2022-07-21 PROCEDURE — 27000207 HC ISOLATION

## 2022-07-21 PROCEDURE — 86318 IA INFECTIOUS AGENT ANTIBODY: CPT | Performed by: INTERNAL MEDICINE

## 2022-07-21 PROCEDURE — 99223 PR INITIAL HOSPITAL CARE,LEVL III: ICD-10-PCS | Mod: ,,, | Performed by: INTERNAL MEDICINE

## 2022-07-21 PROCEDURE — 25000003 PHARM REV CODE 250: Performed by: SPECIALIST

## 2022-07-21 PROCEDURE — 80048 BASIC METABOLIC PNL TOTAL CA: CPT | Performed by: INTERNAL MEDICINE

## 2022-07-21 PROCEDURE — 97530 THERAPEUTIC ACTIVITIES: CPT

## 2022-07-21 PROCEDURE — 27000221 HC OXYGEN, UP TO 24 HOURS

## 2022-07-21 RX ADMIN — HYDRALAZINE HYDROCHLORIDE 25 MG: 25 TABLET ORAL at 08:07

## 2022-07-21 RX ADMIN — TRAMADOL HYDROCHLORIDE 50 MG: 50 TABLET, COATED ORAL at 04:07

## 2022-07-21 RX ADMIN — LISINOPRIL 20 MG: 20 TABLET ORAL at 09:07

## 2022-07-21 RX ADMIN — LEVOTHYROXINE SODIUM 50 MCG: 50 TABLET ORAL at 04:07

## 2022-07-21 RX ADMIN — ACETAMINOPHEN 500 MG: 500 TABLET, FILM COATED ORAL at 09:07

## 2022-07-21 RX ADMIN — RIVAROXABAN 10 MG: 10 TABLET, FILM COATED ORAL at 04:07

## 2022-07-21 RX ADMIN — TRAMADOL HYDROCHLORIDE 50 MG: 50 TABLET, COATED ORAL at 08:07

## 2022-07-21 RX ADMIN — HYDRALAZINE HYDROCHLORIDE 25 MG: 25 TABLET ORAL at 09:07

## 2022-07-21 RX ADMIN — ACETAMINOPHEN 500 MG: 500 TABLET, FILM COATED ORAL at 08:07

## 2022-07-21 RX ADMIN — ACETAMINOPHEN 500 MG: 500 TABLET, FILM COATED ORAL at 04:07

## 2022-07-21 RX ADMIN — METOPROLOL SUCCINATE 50 MG: 50 TABLET, EXTENDED RELEASE ORAL at 09:07

## 2022-07-21 RX ADMIN — POLYETHYLENE GLYCOL 3350 17 G: 17 POWDER, FOR SOLUTION ORAL at 09:07

## 2022-07-21 RX ADMIN — FAMOTIDINE 20 MG: 20 TABLET, FILM COATED ORAL at 09:07

## 2022-07-21 RX ADMIN — LISINOPRIL 20 MG: 20 TABLET ORAL at 08:07

## 2022-07-21 NOTE — PROGRESS NOTES
[facility]                       [address] [id number]    PATIENT NAME:       MATTHEW JOHNSON  YOB: 1925  CSN:                [id number]   MRN:                [id number]  ADMIT DATE:         07/18/2022 16:47:00  PHYSICIAN:          Yoni Hale MD                            PROGRESS NOTE    DATE:      HISTORY OF PRESENT ILLNESS:  89-year-old  [place] female, status   post hip surgery with a breast mass on the right.  She is doing okay.  Denies   any shortness of breath.  No chest pain or palpitations.  She does have pain to   the area. I discussed the case with Dr. Kelley, Hem-Onc, and she will evaluate   the patient and probably do a biopsy here.  Will get her to rehab hospital as   soon as able.  She does not complain of any other problems.    REVIEW OF SYSTEMS:  X12 as above.    PHYSICAL EXAMINATION:  VITAL SIGNS:  She is afebrile.  Blood pressure is 148/76, pulse 76, and temp   97.8.  GENERAL APPEARANCE:  She is alert, in no acute distress.    HEART:  Regular rhythm and rate without murmur.  LUNGS:  Clear.    ABDOMEN:  Soft, nontender.    EXTREMITIES:  No clubbing, cyanosis, or edema.  NEUROLOGIC:  Nonfocal.    LABORATORY STUDIES:  There are no labs.    IMPRESSION:    1. Status post fall with left hip open reduction and internal fixation.    2. Right breast mass with possible metastasis.  3. Hypertension.  4. Dyslipidemia.  5. Hypothyroidism.  6. Osteoarthritis.  7. Mild postop anemia.    PLAN:  We will continue to mobilize with therapy.  Will get Heme-Onc to see for   possible biopsy and treatment.  Will continue DVT prophylaxis.  Will start   antihypertensives.  We will put a consult to the [facility]   for evaluation and treatment.        ______________________________  Yoni Hale MD    KAVITA/AQS  DD:  07/21/2022  Time:  07:49AM  DT:  07/21/2022   Time:  08:15AM  Job #:  [id number]      PROGRESS NOTE

## 2022-07-21 NOTE — PT/OT/SLP PROGRESS
Occupational Therapy      Patient Name:  Leidy Hdez   MRN:  [id number]    Patient not seen today secondary to pt stating that she was fatigued from prior PT session. OT attempted treatment session 3x. OT to will follow-up 7/22/22.    7/21/2022  Signed by OT Lacy Barboza

## 2022-07-21 NOTE — PLAN OF CARE
Problem: Adult Inpatient Plan of Care  Goal: Patient-Specific Goal (Individualized)  Outcome: Ongoing, Progressing  Flowsheets (Taken 7/21/2022 0109)  Patient-Specific Goals (Include Timeframe): TO BE ABLE TO GET MYSELF TO THE BATHROOM BEFORE I LEAVE     Problem: Infection  Goal: Absence of Infection Signs and Symptoms  Outcome: Ongoing, Progressing     Problem: Fall Injury Risk  Goal: Absence of Fall and Fall-Related Injury  Outcome: Ongoing, Progressing

## 2022-07-21 NOTE — PLAN OF CARE
[facility] confirmed they would accept. Spoke with Dr Hale who has talked with pt and son explaining that he goes to [facility] acute rehab . Son and pt confirm they would like [facility]. Referral sent and Keila notified at [facility].   Covid ordered  [facility] and pt may be getting a breast bx prior. Will also follow for covid result

## 2022-07-21 NOTE — PT/OT/SLP PROGRESS
Physical Therapy Treatment    Patient Name:  Leidy Hdez   MRN:  [id number]    Recommendations:     Discharge Recommendations:  rehabilitation facility   Discharge Equipment Recommendations: walker, rolling   Barriers to discharge: acuity of illness    Assessment:     Leidy Hdez is a 89 y.o. female admitted with a medical diagnosis of Nondisplaced intertrochanteric fracture of left femur, init.  She presents with the following impairments/functional limitations:  weakness, impaired endurance, impaired functional mobility, impaired balance, gait instability, decreased lower extremity function, pain, orthopedic precautions .    Rehab Prognosis: Good; patient would benefit from acute skilled PT services to address these deficits and reach maximum level of function.    Recent Surgery: Procedure(s) (LRB):  INSERTION, INTRAMEDULLARY VERNELL, FEMUR (Left) 2 Days Post-Op    Plan:     During this hospitalization, patient to be seen daily to address the identified rehab impairments via gait training, therapeutic activities, neuromuscular re-education, therapeutic exercises and progress toward the following goals:    · Plan of Care Expires:  08/19/22    Subjective     Chief Complaint: pain and fatigue  Patient/Family Comments/goals: to be independent  Pain/Comfort:  · Pain Rating 1: 7/10  · Location - Side 1: Left  · Location 1: hip  · Pain Addressed 1: Nurse notified      Objective:     Communicated with RN prior to session.  Patient found HOB elevated with peripheral IV, PureWick upon PT entry to room.     General Precautions: Standard,     Orthopedic Precautions:LLE weight bearing as tolerated   Braces: N/A  Respiratory Status: Nasal cannula, flow 2 L/min     Functional Mobility:  · Bed Mobility:     · Supine to Sit: minimum assistance x 2  · Sit to Supine: minimum assistance x1  · Rolling bilaterally w/ Suyapa for diaper change 2/2 BM  · Transfers:     · Sit to Stand:  minimum assistance with rolling walker  · Gait:  pt demo'd a slow step through gt pattern x 12 ft, short step length bilat, w/ use of RW and Suyapa for blaance.       AM-PAC 6 CLICK MOBILITY  Turning over in bed (including adjusting bedclothes, sheets and blankets)?: 3  Sitting down on and standing up from a chair with arms (e.g., wheelchair, bedside commode, etc.): 3  Moving from lying on back to sitting on the side of the bed?: 3  Moving to and from a bed to a chair (including a wheelchair)?: 3  Need to walk in hospital room?: 3  Climbing 3-5 steps with a railing?: 1  Basic Mobility Total Score: 16       Patient left up in chair with all lines intact, call button in reach, RN notified and son present..    GOALS:   Multidisciplinary Problems     Physical Therapy Goals        Problem: Physical Therapy    Goal Priority Disciplines Outcome Goal Variances Interventions   Physical Therapy Goal     PT, PT/OT Ongoing, Progressing     Description: Goals to be met by: 2022     Patient will increase functional independence with mobility by performin. Supine to sit with Stand-by Assistance  2. Sit to supine with Stand-by Assistance  3. Sit to stand transfer with Stand-by Assistance  4. Gait  x 300 feet with Supervision using Rolling Walker.                      Time Tracking:     PT Received On: 22  PT Start Time: 1340     PT Stop Time: 1404  PT Total Time (min): 24 min     Billable Minutes: Therapeutic Activity 24 mins    Treatment Type: Treatment  PT/PTA: PT     PTA Visit Number: 1     2022

## 2022-07-22 PROCEDURE — 25000003 PHARM REV CODE 250: Performed by: INTERNAL MEDICINE

## 2022-07-22 PROCEDURE — 25000003 PHARM REV CODE 250: Performed by: SPECIALIST

## 2022-07-22 PROCEDURE — 99231 PR SUBSEQUENT HOSPITAL CARE,LEVL I: ICD-10-PCS | Mod: ,,, | Performed by: INTERNAL MEDICINE

## 2022-07-22 PROCEDURE — 27000221 HC OXYGEN, UP TO 24 HOURS

## 2022-07-22 PROCEDURE — 97530 THERAPEUTIC ACTIVITIES: CPT

## 2022-07-22 PROCEDURE — 97535 SELF CARE MNGMENT TRAINING: CPT | Mod: CO

## 2022-07-22 PROCEDURE — 99231 SBSQ HOSP IP/OBS SF/LOW 25: CPT | Mod: ,,, | Performed by: INTERNAL MEDICINE

## 2022-07-22 PROCEDURE — 11000001 HC ACUTE MED/SURG PRIVATE ROOM

## 2022-07-22 PROCEDURE — 99900035 HC TECH TIME PER 15 MIN (STAT)

## 2022-07-22 PROCEDURE — 27000207 HC ISOLATION

## 2022-07-22 RX ADMIN — MELATONIN TAB 3 MG 6 MG: 3 TAB at 08:07

## 2022-07-22 RX ADMIN — TRAMADOL HYDROCHLORIDE 50 MG: 50 TABLET, COATED ORAL at 08:07

## 2022-07-22 RX ADMIN — ACETAMINOPHEN 500 MG: 500 TABLET, FILM COATED ORAL at 08:07

## 2022-07-22 RX ADMIN — HYDRALAZINE HYDROCHLORIDE 25 MG: 25 TABLET ORAL at 08:07

## 2022-07-22 RX ADMIN — RIVAROXABAN 10 MG: 10 TABLET, FILM COATED ORAL at 07:07

## 2022-07-22 RX ADMIN — LISINOPRIL 20 MG: 20 TABLET ORAL at 08:07

## 2022-07-22 NOTE — ANESTHESIA POSTPROCEDURE EVALUATION
Anesthesia Post Evaluation    Patient: Leidy Hdez    Procedure(s) Performed: Procedure(s) (LRB):  INSERTION, INTRAMEDULLARY VERNELL, FEMUR (Left)    Final Anesthesia Type: general      Patient location during evaluation: floor  Patient participation: Yes- Able to Participate  Level of consciousness: awake and alert  Post-procedure vital signs: reviewed and stable  Pain management: adequate  Airway patency: patent      Anesthetic complications: no      Cardiovascular status: blood pressure returned to baseline  Respiratory status: unassisted  Hydration status: euvolemic  Follow-up not needed.          Vitals Value Taken Time   /77 07/22/22 1120   Temp 36.6 °C (97.8 °F) 07/22/22 1120   Pulse 86 07/22/22 1120   Resp 18 07/22/22 1120   SpO2 92 % 07/22/22 1120         Event Time   Out of Recovery 07/19/2022 17:54:00         Pain/Raulito Score: Pain Rating Prior to Med Admin: 10 (7/21/2022  8:46 PM)  Pain Rating Post Med Admin: 0 (7/21/2022  9:41 PM)

## 2022-07-22 NOTE — PT/OT/SLP PROGRESS
Occupational Therapy  Treatment    Leidy Hdez   MRN: [id number]   Admitting Diagnosis: Nondisplaced intertrochanteric fracture of left femur, init    OT Date of Treatment: 07/22/22   OT Start Time: 0904  OT Stop Time: 0924  OT Total Time (min): 20 min     Billable Minutes:  Self Care/Home Management 20  Total Minutes: 20     OT/TIFFANY: TIFFANY     TIFFANY Visit Number: 1    General Precautions: Standard, fall (primary language is Palestinian but speaks some english)  Orthopedic Precautions: LLE weight bearing as tolerated  Braces: N/A    Spiritual, Cultural Beliefs, Gnosticist Practices, Values that Affect Care: no    Subjective:  Flat affect, complaining of hip pain upon bed mobility but open to OT session.     Pain/Comfort  Location - Side 1: Left  Location 1: hip  Pain Addressed 1: Reposition, Distraction    Objective:  Patient found with: peripheral IV, PureWick    Functional Mobility:  Bed Mobility:   Supine to sit: Minimal Assistance   Sit to supine: Minimal Assistance   Rolling: Activity did not occur   Scooting: Minimal Assistance    Grooming:  Pt completed oral hygiene seated EOB with Set-up A. SBA for sitting balance.      Toilet Training:  Pt completed stand-step t/f to [facility] [place] Min A for safety and walker management. Did not feel urge to void but able to sit/stand from BSC with good safety awareness.     Balance:   Static Sit: GOOD-: Takes MODERATE challenges from all directions but inconsistently  Dynamic Sit:  FAIR+: Maintains balance through MINIMAL excursions of active trunk motion  Static Stand: FAIR+: Takes MINIMAL challenges from all directions  Dynamic stand: POOR+: Needs MIN (minimal ) assist during gait    Additional Treatment:      Patient left HOB elevated with all lines intact, call button in reach and son present    ASSESSMENT:  Leidy Hdez is a 89 y.o. female with a medical diagnosis of Nondisplaced intertrochanteric fracture of left femur, init. Pt tolerated session well despite pain. Noted  to fatigue towards end of session 2/2 attempting to lay back down. Progressing well towards goals.     Rehab potential is excellent    Activity tolerance: Good    Discharge recommendations: rehabilitation facility     Equipment recommendations: walker, rolling     GOALS:   Multidisciplinary Problems     Occupational Therapy Goals        Problem: Occupational Therapy    Goal Priority Disciplines Outcome Interventions   Occupational Therapy Goal     OT, PT/OT Ongoing, Progressing    Description: Goals to be met by: 7/3  Patient will increase functional independence with ADLs by performing:    LE Dressing with Minimal Assistance.  Grooming while standing at sink with Stand-by Assistance.  Toileting from toilet with Stand-by Assistance for hygiene and clothing management.   Toilet transfer to toilet with Stand-by Assistance.                     Plan:  Patient to be seen 5 x/week, daily to address the above listed problems via self-care/home management, therapeutic activities, therapeutic exercises  Plan of Care expires: 08/03/22  Plan of Care reviewed with: patient, son    Documented by LISA Bucio. Co-signed and reviewed by ABEL Nicole Mai.        07/22/2022

## 2022-07-22 NOTE — PROGRESS NOTES
"Progress Note  Hematology/Oncology      Consult Requested By: Yoni Hale MD    Reason for Consult: Breast mass    SUBJECTIVE:     History of Present Illness:  Patient is a 89 y.o.  female presents after a fall.  I speak Burkinan therefore I did not need an .  She sustained a nondisplaced fracture to the left femur and is status post ORIF of the left hip intertrochanteric femur fracture.     CTA of the chest with no evidence of pulmonary embolism but showed enlarged paratracheal, prevascular and subcarinal lymphadenopathy.  Subcarinal lymph node measures 3.2 x 2.2 cm.  Right perihilar area of soft tissue thickening measuring 3.3 cm suspicious for malignancy.  Small right pleural effusion.  Lobulated mass in the right breast measuring 3.7 x 2.6 cm.     Patient reports that about a year ago she fell and hit her right breast.  She felt a mass but she reports that has been the same size since then.  She report sister diagnosed with breast cancer in in her 80s.    Patient is seen today in rounds and she is lying in bed.  Son at bedside.    7/22/2022: Patient is seen in rounds this morning. Son at bedside. He told me that after I left she start crying because of the word "cancer". He does not want me to use the word Cancer in front of her. She is doing good today. She feels better. She told me that she exercise  this morning. NO fever, chills, sweats.    Continuous Infusions:   sodium chloride 0.9% 75 mL/hr at 07/19/22 2000     Scheduled Meds:   acetaminophen  500 mg Oral 6 times per day    bisacodyL  10 mg Rectal Daily    famotidine  20 mg Oral Daily    hydrALAZINE  25 mg Oral Q12H    levothyroxine  50 mcg Oral Daily    lisinopriL  20 mg Oral BID    metoprolol succinate  50 mg Oral Daily    polyethylene glycol  17 g Oral BID    rivaroxaban  10 mg Oral Daily with dinner    senna-docusate 8.6-50 mg  2 tablet Oral QHS     PRN Meds:aluminum-magnesium hydroxide-simethicone, calcium carbonate, " diphenhydrAMINE, HYDROmorphone, lactulose, melatonin, methocarbamoL, ondansetron, ondansetron, traMADoL    No past medical history on file.  Past Surgical History:   Procedure Laterality Date    INTRAMEDULLARY RODDING OF FEMUR Left 7/19/2022    Procedure: INSERTION, INTRAMEDULLARY VERNELL, FEMUR;  Surgeon: Antonio Matthew MD;  Location: [facility];  Service: Orthopedics;  Laterality: Left;     No family history on file.       Review of patient's allergies indicates:   Allergen Reactions    Atropine     Diazepam     Promethazine       No current facility-administered medications on file prior to encounter.     Current Outpatient Medications on File Prior to Encounter   Medication Sig Dispense Refill    atorvastatin (LIPITOR) 10 MG tablet Take 10 mg by mouth once daily.      hydrALAZINE (APRESOLINE) 50 MG tablet TAKE 1/2 (ONE-HALF) TABLET BY MOUTH TWICE DAILY. IF BLOOD PRESSURE AVERAGE IS GREATEER THAN 130/80 AFTER 2 DAYS, INCREASE TO 1 TABLET TWICE DAILY. IF BLOOD PRESSURE AVERAGE IS GREATER THAN 130/80 AFTER 2 DAYS, INCREASE TO 2 TABLETS TWICE DAILY      levothyroxine (SYNTHROID) 50 MCG tablet Take 50 mcg by mouth once daily.      lisinopriL (PRINIVIL,ZESTRIL) 20 MG tablet Take 20 mg by mouth 2 (two) times daily.      metoprolol succinate (TOPROL-XL) 50 MG 24 hr tablet Take 50 mg by mouth once daily.         Review of Systems   Constitutional: Negative for activity change, appetite change, chills, fatigue, fever and unexpected weight change.   HENT: Negative for mouth dryness, mouth sores, nosebleeds, sore throat and trouble swallowing.    Eyes: Negative for visual disturbance.   Respiratory: Negative for cough and shortness of breath.    Cardiovascular: Negative for chest pain, palpitations and leg swelling.   Gastrointestinal: Negative for abdominal distention, abdominal pain, blood in stool, change in bowel habit, constipation, diarrhea, nausea, vomiting and change in bowel habit.   Endocrine: Negative.     Genitourinary: Negative for dysuria, frequency, hematuria and urgency.   Musculoskeletal: Negative for arthralgias, back pain, myalgias and neck pain.   Integumentary:  Negative for rash, breast mass, breast discharge and breast tenderness.   Neurological: Negative for dizziness, tremors, syncope, speech difficulty, weakness, light-headedness, numbness, headaches and memory loss.   Hematological: Does not bruise/bleed easily.   Psychiatric/Behavioral: Negative for confusion and suicidal ideas.   Breast: Negative for mass and tenderness        OBJECTIVE:     Vital Signs (Most Recent)  Temp: 97.8 °F (36.6 °C) (07/22/22 1120)  Pulse: 86 (07/22/22 1120)  Resp: 18 (07/22/22 1120)  BP: (!) 151/77 (07/22/22 1120)  SpO2: (!) 92 % (07/22/22 1120)    Pain Assessment: No pain reported at this time    Vital Signs Range (Last 24H):  Temp:  [97.7 °F (36.5 °C)-98.2 °F (36.8 °C)]   Pulse:  [72-87]   Resp:  [17-18]   BP: (124-155)/(68-85)   SpO2:  [90 %-94 %]     Physical Exam:  Physical Exam  Vitals and nursing note reviewed.   Constitutional:       General: She is not in acute distress.     Appearance: She is ill-appearing.      Comments: Thin and frail looking   HENT:      Head: Normocephalic and atraumatic.      Mouth/Throat:      Mouth: Mucous membranes are moist.   Eyes:      General: No scleral icterus.     Extraocular Movements: Extraocular movements intact.      Conjunctiva/sclera: Conjunctivae normal.      Pupils: Pupils are equal, round, and reactive to light.   Neck:      Vascular: No JVD.   Cardiovascular:      Rate and Rhythm: Normal rate and regular rhythm.      Heart sounds: No murmur heard.  Pulmonary:      Effort: Pulmonary effort is normal.      Breath sounds: Normal breath sounds. No wheezing or rhonchi.   Chest:      Chest wall: No deformity or tenderness.   Breasts:      Right: No swelling, mass, nipple discharge, skin change, tenderness, axillary adenopathy or supraclavicular adenopathy.      Left: Mass  present. No nipple discharge, skin change, tenderness, axillary adenopathy or supraclavicular adenopathy.        Comments: Patient has a 2-3 cm hard fixed mass in the tail of the right breast.  She has a 2nd mass mostly right axillary area that is very large fixed and hard.  Abdominal:      General: Bowel sounds are normal. There is no distension.      Palpations: Abdomen is soft. There is no mass.      Tenderness: There is no abdominal tenderness.   Musculoskeletal:         General: No swelling or deformity.      Cervical back: Neck supple.   Lymphadenopathy:      Cervical: No cervical adenopathy.      Upper Body:      Right upper body: No supraclavicular or axillary adenopathy.      Left upper body: No supraclavicular or axillary adenopathy.      Lower Body: No right inguinal adenopathy. No left inguinal adenopathy.   Skin:     General: Skin is warm.      Coloration: Skin is not jaundiced.      Findings: No lesion or rash.      Nails: There is no clubbing.   Neurological:      General: No focal deficit present.      Mental Status: She is alert and oriented to person, place, and time.      Cranial Nerves: Cranial nerve deficit present.      Comments: Hearing deficit   Psychiatric:         Attention and Perception: Attention normal.         Mood and Affect: Mood and affect normal.         Speech: Speech normal.         Behavior: Behavior is cooperative.         Thought Content: Thought content normal.         Cognition and Memory: Cognition normal.         Judgment: Judgment normal.         Laboratory:  CBC with Differential:  No results for input(s): WBC, NEUTROPCT, LYMPH, MONOPCT, EOSPCT, BASOPHIL, RBC, HCT, HGB, MCV, MCH, RDW, PLT, MPV in the last 24 hours.    Invalid input(s): MCMC  CMP:  No results for input(s): GLU, CALCIUM, ALBUMIN, PROT, NA, K, CO2, CL, BUN, CREATININE, ALKPHOS, ALT, AST, BILITOT in the last 24 hours.  BMP:   No results for input(s): GLU, CALCIUM, NA, K, CO2, CL, BUN, CREATININE in the last  24 hours.   Latest Reference Range & Units 07/18/22 17:42   Alkaline Phosphatase 40 - 150 unit/L 76   PROTEIN TOTAL 5.8 - 7.6 gm/dL 6.4   Albumin 3.4 - 4.8 gm/dL 3.2 (L)   Albumin/Globulin Ratio 1.1 - 2.0 ratio 1.0 (L)   BILIRUBIN TOTAL <=1.5 mg/dL 0.9   AST 5 - 34 unit/L 23   ALT 0 - 55 unit/L 23   Globulin, Total 2.4 - 3.5 gm/dL 3.2   (L): Data is abnormally lowTumor Markers: No results for input(s): PSA, CEA, , AFPTM, TI2680,  in the last 24 hours.    Invalid input(s): ALGTM  Immunology: No results for input(s): SPEP, FRANCES, ELIAN, FREELAMBDALI in the last 24 hours.  Coagulation: No results for input(s): PT, INR, APTT in the last 24 hours.  Specimen (24h ago, onward)            None        Microbiology Results (last 7 days)     Procedure Component Value Units Date/Time    Clostridium Diff Toxin, A & B, EIA [[id number]]  (Normal) Collected: 07/21/22 1523    Order Status: Completed Specimen: Stool Updated: 07/21/22 1610     Clostridium Difficile GDH Antigen Negative     Clostridium Difficile Toxin A/B Negative          Diagnostic Results:  Imaging Results          CTA Chest Non-Coronary (PE Study) (Final result)  Result time 07/18/22 20:30:20    Final result by Kodak Young MD (07/18/22 20:30:20)                 Impression:      1. No evidence of pulmonary embolism through the level of the subsegmental pulmonary arteries.  2. Lobulated mass in the right breast suspicious for malignancy.  3. Lobulated right perihilar soft tissue thickening suspicious for malignancy with metastatic mediastinal adenopathy.      Electronically signed by: Kodak Young MD  Date:    07/18/2022  Time:    20:30             Narrative:    EXAMINATION:  CTA CHEST NON CORONARY    CLINICAL HISTORY:  Pulmonary embolism (PE) suspected, unknown D-dimer;    TECHNIQUE:  Axial images of the chest were obtained with IV contrast administration.  Coronal and sagittal reconstructions were provided.  Three dimensional and MIP images were obtained and  evaluated.  Total DLP was 183 mGy-cm. Dose lowering technique and automated exposure control were utilized for this exam.    COMPARISON:  Chest radiograph 07/18/2022.    FINDINGS:  There is no filling defect within the pulmonary arteries through the level of the subsegmental pulmonary arteries.  There is no CT evidence of right heart strain.    The airway is widely patent.  There is enlarged paratracheal, prevascular, and subcarinal lymphadenopathy.  A representative subcarinal lymph node measures 3.2 x 2.2 cm.    There is a right perihilar area of soft tissue thickening measuring approximately 3.3 cm suspicious for malignancy.  There is a small right pleural effusion.  There is lower lobe atelectasis.    There is a lobulated mass in the right breast measuring 3.7 x 2.6 cm.  The upper abdomen demonstrates a small hiatal hernia.  There is no lytic or blastic osseous lesion.                               X-Ray Chest AP Portable (Final result)  Result time 07/18/22 18:26:37    Final result by Selwyn Pink MD (07/18/22 18:26:37)                 Impression:      Areas of hazy right lung opacity, greatest inferiorly.  Some of this may relate to overlying soft tissue, but pneumonia and right pleural effusion also possible.  Follow-up PA and lateral chest radiographs can be considered.      Electronically signed by: Selwyn Pink  Date:    07/18/2022  Time:    18:26             Narrative:    EXAMINATION:  XR CHEST AP PORTABLE    CLINICAL HISTORY:  hypoxemia;    TECHNIQUE:  Frontal view(s) of the chest.    COMPARISON:  Radiography 01/09/2018    FINDINGS:  Hazy opacity in the lower right lung and extending peripherally over the mid and upper right lung.  No consolidation or significant pleural fluid on the left.  No definitive pneumothorax.  Cardiac silhouette within normal limits for technique.                               X-Ray Femur Ap/Lat Left (Final result)  Result time 07/18/22 18:09:34    Final result by Hayley  ZANDRA Wilson MD (07/18/22 18:09:34)                 Impression:      Intertrochanteric fracture left hip      Electronically signed by: Hayley Wilson  Date:    07/18/2022  Time:    18:09             Narrative:    EXAMINATION:  XR FEMUR 2 VIEW LEFT    CLINICAL HISTORY:  Injury, unspecified, initial encounter    TECHNIQUE:  AP and lateral views of the left femur were performed.    COMPARISON:  None}    FINDINGS:  There is an inter trochanteric fracture of the left hip.  No significant displacement is seen.  No angulation is seen.  No other fractures are seen.                               CT Pelvis Without Contrast (Final result)  Result time 07/18/22 18:08:48    Final result by Hayley Wilson MD (07/18/22 18:08:48)                 Impression:      Intertrochanteric fracture seen on the left side as outlined above      Electronically signed by: Hayley Wilson  Date:    07/18/2022  Time:    18:08             Narrative:    EXAMINATION:  CT PELVIS WITHOUT CONTRAST    CLINICAL HISTORY:  Pelvic fracture;    TECHNIQUE:  Multiple axial images were obtained of the pelvis from the iliac crest to the pubic symphysis and sagittal and coronal reconstructions were performed.  No contrast was administered.    COMPARISON:  None    FINDINGS:  There is a fracture along the intertrochanteric region on the left side.  Fracture is not displaced.  There is comminution of the fracture at the level of the greater trochanter.  The ischium appears to be intact on the right and left side.  Iliac bone is intact on the right and left side.  The right proximal femur appears to be intact.  The sacrum is intact.                                    ASSESSMENT/PLAN:     Patient Active Problem List   Diagnosis    Nondisplaced intertrochanteric fracture of left femur, init    Mass of axillary tail of right breast    Adenopathy        Right breast mass       Anemia      Plan  Recommend biopsy of the large right axillary mass.   If  positive for breast cancer:   -if ER positive may benefit of endocrine therapy    -if ER negative I will not recommend any treatment at all due to her age and fragility. She will not be able to tolerate chemotherapy and will do more harm than good.  CA 27-29--unable to order through Epic system  Cont postop care  Agree with Rehab  Consult IR for US guided biopsy of axillary mass    Dr Nash will be available for any questions over the weekend.      Almaz Kelley MD  Hematology/Oncology  [facility]

## 2022-07-22 NOTE — PLAN OF CARE
Dr Kelley rounded this am and confirms she wants the pt to have breast biopsy here before she leaves to go to [facility] rehab. Dr did not want to wait until after she was done with [facility] rehab  [facility] notified. We will anticipate dc Monday  Allowing time here for the biopsy to be done.  Son Thom aware.   Sarah updates me that our IR said that can be done as outpt. Sarah will contact cancer center to make inquires there about biopsy  Dr Kelley updated regarding above.   Anticipate Dr Hale to round today. I anticipate dc to [facility] on Monday

## 2022-07-22 NOTE — PT/OT/SLP PROGRESS
Physical Therapy Treatment    Patient Name:  Leidy Hdez   MRN:  [id number]    Recommendations:     Discharge Recommendations:  rehabilitation facility   Discharge Equipment Recommendations: walker, rolling   Barriers to discharge: severity of deficits    Assessment:     Leidy Hdez is a 89 y.o. female admitted with a medical diagnosis of Nondisplaced intertrochanteric fracture of left femur, init.  She presents with the following impairments/functional limitations:  weakness, impaired endurance, impaired functional mobility, gait instability, decreased lower extremity function, impaired balance, pain, orthopedic precautions. Pt required increased time to complete all task.     Rehab Prognosis: Good; patient would benefit from acute skilled PT services to address these deficits and reach maximum level of function.    Recent Surgery: Procedure(s) (LRB):  INSERTION, INTRAMEDULLARY VERNELL, FEMUR (Left) 3 Days Post-Op    Plan:     During this hospitalization, patient to be seen daily to BID to address the identified rehab impairments via gait training, therapeutic activities, therapeutic exercises, neuromuscular re-education and progress toward the following goals:    · Plan of Care Expires:  08/21/22    Subjective     Chief Complaint: pain  Patient/Family Comments/goals: to get stronger  Pain/Comfort:  · Pain Rating 1: 7/10  · Location - Side 1: Left  · Location 1: hip  · Pain Addressed 1: Nurse notified      Objective:     Communicated with RN prior to session.  Patient found HOB elevated with peripheral IV, oxygen, PureWick upon PT entry to room.     General Precautions: Standard,     Orthopedic Precautions:LLE weight bearing as tolerated   Braces: N/A  Respiratory Status: Nasal cannula, flow 2 L/min 95%  BP- 134/84mmHg w/ first c/o dizziness, 166/79mmHg with second c/o dizziness; RN notified     Functional Mobility:  · Bed Mobility:     · Scooting: moderate assistance  · Supine to Sit: moderate  assistance  · Transfers:     · Sit to Stand:  minimum assistance with rolling walker x 3 trials  · Gait: pt demo'd a slow step to gt pattern x 30 ft totals w/ seated rest breaks. Pt utilized RW and required [place] for balance.       AM-PAC 6 CLICK MOBILITY  Turning over in bed (including adjusting bedclothes, sheets and blankets)?: 3  Sitting down on and standing up from a chair with arms (e.g., wheelchair, bedside commode, etc.): 3  Moving from lying on back to sitting on the side of the bed?: 2  Moving to and from a bed to a chair (including a wheelchair)?: 3  Need to walk in hospital room?: 3  Climbing 3-5 steps with a railing?: 1  Basic Mobility Total Score: 15       Patient left up in chair with all lines intact, call button in reach and RN notified..    GOALS:   Multidisciplinary Problems     Physical Therapy Goals        Problem: Physical Therapy    Goal Priority Disciplines Outcome Goal Variances Interventions   Physical Therapy Goal     PT, PT/OT Ongoing, Progressing     Description: Goals to be met by: 2022     Patient will increase functional independence with mobility by performin. Supine to sit with Stand-by Assistance  2. Sit to supine with Stand-by Assistance  3. Sit to stand transfer with Stand-by Assistance  4. Gait  x 300 feet with Supervision using Rolling Walker.                      Time Tracking:     PT Received On: 22  PT Start Time: 1233     PT Stop Time: 1256  PT Total Time (min): 23 min     Billable Minutes: Therapeutic Activity 23 mins    Treatment Type: Treatment  PT/PTA: PT     PTA Visit Number: 3     2022

## 2022-07-23 PROCEDURE — 94799 UNLISTED PULMONARY SVC/PX: CPT

## 2022-07-23 PROCEDURE — 25000003 PHARM REV CODE 250: Performed by: INTERNAL MEDICINE

## 2022-07-23 PROCEDURE — 27000221 HC OXYGEN, UP TO 24 HOURS

## 2022-07-23 PROCEDURE — 97116 GAIT TRAINING THERAPY: CPT | Mod: CQ

## 2022-07-23 PROCEDURE — 97530 THERAPEUTIC ACTIVITIES: CPT | Mod: CQ

## 2022-07-23 PROCEDURE — 11000001 HC ACUTE MED/SURG PRIVATE ROOM

## 2022-07-23 PROCEDURE — 25000003 PHARM REV CODE 250: Performed by: SPECIALIST

## 2022-07-23 PROCEDURE — 27000207 HC ISOLATION

## 2022-07-23 PROCEDURE — 99900035 HC TECH TIME PER 15 MIN (STAT)

## 2022-07-23 PROCEDURE — 63600175 PHARM REV CODE 636 W HCPCS: Performed by: SPECIALIST

## 2022-07-23 PROCEDURE — 51798 US URINE CAPACITY MEASURE: CPT

## 2022-07-23 RX ORDER — SODIUM CHLORIDE 9 MG/ML
INJECTION, SOLUTION INTRAVENOUS CONTINUOUS
Status: DISCONTINUED | OUTPATIENT
Start: 2022-07-23 | End: 2022-07-25

## 2022-07-23 RX ADMIN — ONDANSETRON 4 MG: 2 INJECTION INTRAMUSCULAR; INTRAVENOUS at 08:07

## 2022-07-23 RX ADMIN — FAMOTIDINE 20 MG: 20 TABLET, FILM COATED ORAL at 08:07

## 2022-07-23 RX ADMIN — ACETAMINOPHEN 500 MG: 500 TABLET, FILM COATED ORAL at 08:07

## 2022-07-23 RX ADMIN — TRAMADOL HYDROCHLORIDE 50 MG: 50 TABLET, COATED ORAL at 08:07

## 2022-07-23 RX ADMIN — METOPROLOL SUCCINATE 50 MG: 50 TABLET, EXTENDED RELEASE ORAL at 08:07

## 2022-07-23 RX ADMIN — RIVAROXABAN 10 MG: 10 TABLET, FILM COATED ORAL at 04:07

## 2022-07-23 RX ADMIN — ACETAMINOPHEN 500 MG: 500 TABLET, FILM COATED ORAL at 04:07

## 2022-07-23 RX ADMIN — SODIUM CHLORIDE: 9 INJECTION, SOLUTION INTRAVENOUS at 04:07

## 2022-07-23 RX ADMIN — ACETAMINOPHEN 500 MG: 500 TABLET, FILM COATED ORAL at 12:07

## 2022-07-23 RX ADMIN — ACETAMINOPHEN 500 MG: 500 TABLET, FILM COATED ORAL at 11:07

## 2022-07-23 RX ADMIN — LISINOPRIL 20 MG: 20 TABLET ORAL at 08:07

## 2022-07-23 RX ADMIN — LEVOTHYROXINE SODIUM 50 MCG: 50 TABLET ORAL at 04:07

## 2022-07-23 RX ADMIN — HYDRALAZINE HYDROCHLORIDE 25 MG: 25 TABLET ORAL at 08:07

## 2022-07-23 RX ADMIN — MELATONIN TAB 3 MG 6 MG: 3 TAB at 08:07

## 2022-07-23 NOTE — PROGRESS NOTES
A 89-year-old  American female.  She is doing fairly good except for   pain in her surgical area.  She is set up to start or rehab on Monday.  She   denies any shortness of breath, chest pain, palpitations, headaches, or any   other problems.  I spoke with Hem-Onc and breast surgeon, and she is going to   have her breast biopsy as outpatient.  No other significant issues at the   present time.  She has been afebrile.  Her sats have been in the low 90s on   nasal cannula.     Looks more dry started on ivf,           REVIEW OF SYSTEMS:  X12 as above.    PHYSICAL EXAMINATION:  VITAL SIGNS:  Last blood pressure is 149/78, pulse 90, temp 98.4.  Vitals:    07/23/22 0726 07/23/22 1000 07/23/22 1110 07/23/22 1431   BP: (!) 157/82  133/78 133/81   Pulse: 95  73 72   Resp: 20 18 18   Temp: 97.6 °F (36.4 °C)  97.7 °F (36.5 °C) 97.9 °F (36.6 °C)   TempSrc: Oral  Oral Oral   SpO2: 95% 95% 96% 95%   Weight:       Height:           GENERAL:  Patient is alert, no acute distress.    HEART:  Regular rhythm and rate.  LUNGS:  Clear.    ABDOMEN:  Soft, nontender.    EXTREMITIES:  No clubbing, cyanosis.  Trace edema.  NEUROLOGIC:  Nonfocal.    LABORATORY STUDIES:    Lab Results   Component Value Date    WBC 11.5 07/21/2022    HGB 9.7 (L) 07/21/2022    HCT 30.0 (L) 07/21/2022    MCV 91.2 07/21/2022     07/21/2022       Recent Labs   Lab 07/21/22  1049   *   K 4.8   CO2 19*   BUN 23.5*   CREATININE 0.96   GLUCOSE 104   CALCIUM 8.2*         IMPRESSION:    1. Status post fall with left hip fracture with open reduction and internal   fixation.  2. Right breast mass, possible cancer with metastasis.  3. Hypertension.  4. Dyslipidemia.  5. Osteoarthritis.  6. Hypothyroidism.  7. Mild postop anemia.    PLAN:  Continue therapy  Pain control  Labs as needed  Gi and dvt ppx  Rehabs oon

## 2022-07-23 NOTE — PT/OT/SLP PROGRESS
Physical Therapy Treatment    Patient Name:  Leidy Hdez   MRN:  [id number]    Recommendations:     Discharge Recommendations:  rehabilitation facility   Discharge Equipment Recommendations: walker, rolling     Subjective     Patient calm and cooperative.     Objective:     General Precautions: Standard,     Orthopedic Precautions:LLE weight bearing as tolerated   Braces:    Respiratory Status: 2L NC     Communicated with nurse prior to treatment.     Functional Mobility:    Rolling:Minimal Assistance  Supine to sit:Minimal Assistance  Sit to stand transfer: Moderate Assistance  Bed to chair transfer:Minimal Assistance  Gait 35 ft with RW min assist. Cues for sequence and postural corrections. /104 . Nurse notified and hypertensive due to nausea. Pt did not take her morning meds.       AM-PAC 6 CLICK MOBILITY        Patient left up in chair with all lines intact..    GOALS:   Multidisciplinary Problems     Physical Therapy Goals        Problem: Physical Therapy    Goal Priority Disciplines Outcome Goal Variances Interventions   Physical Therapy Goal     PT, PT/OT Ongoing, Progressing     Description: Goals to be met by: 2022     Patient will increase functional independence with mobility by performin. Supine to sit with Stand-by Assistance  2. Sit to supine with Stand-by Assistance  3. Sit to stand transfer with Stand-by Assistance  4. Gait  x 300 feet with Supervision using Rolling Walker.                      Assessment:     Leidy Hdez is a 89 y.o. female admitted with a medical diagnosis of Nondisplaced intertrochanteric fracture of left femur, init.     Rehab Prognosis: Good; patient would benefit from acute skilled PT services to address these deficits and reach maximum level of function.    Recent Surgery: Procedure(s) (LRB):  INSERTION, INTRAMEDULLARY VERNELL, FEMUR (Left) 4 Days Post-Op    Plan:     During this hospitalization, patient to be seen BID to address the identified  rehab impairments via gait training, therapeutic activities, therapeutic exercises, neuromuscular re-education and progress toward the following goals:    · Plan of Care Expires:  08/21/22    Billable Minutes     Billable Minutes: Gait Training 12 and Therapeutic Activity 12    Treatment Type: Treatment  PT/PTA: PTA     PTA Visit Number: 4     07/23/2022

## 2022-07-23 NOTE — PROGRESS NOTES
[facility]                       [address] [id number]    PATIENT NAME:       MATTHEW JOHNSON  YOB: 1925  CSN:                [id number]   MRN:                [id number]  ADMIT DATE:         07/18/2022 16:47:00  PHYSICIAN:          Yoni Hale MD                            PROGRESS NOTE    DATE:      A 89-year-old  American female.  She is doing fairly good except for   pain in her surgical area.  She is set up to start or rehab on Monday.  She   denies any shortness of breath, chest pain, palpitations, headaches, or any   other problems.  I spoke with Hem-Onc and breast surgeon, and she is going to   have her breast biopsy as outpatient.  No other significant issues at the   present time.  She has been afebrile.  Her sats have been in the low 90s on   nasal cannula.  Blood pressure has been a little bit on the high side in the   130s to 150s systolic.    REVIEW OF SYSTEMS:  X12 as above.    PHYSICAL EXAMINATION:  VITAL SIGNS:  Last blood pressure is 149/78, pulse 90, temp 98.4.  GENERAL:  Patient is alert, no acute distress.    HEART:  Regular rhythm and rate.  LUNGS:  Clear.    ABDOMEN:  Soft, nontender.    EXTREMITIES:  No clubbing, cyanosis.  Trace edema.  NEUROLOGIC:  Nonfocal.    LABORATORY STUDIES:  No new labs today.    IMPRESSION:    1. Status post fall with left hip fracture with open reduction and internal   fixation.  2. Right breast mass, possible cancer with metastasis.  3. Hypertension.  4. Dyslipidemia.  5. Osteoarthritis.  6. Hypothyroidism.  7. Mild postop anemia.    PLAN:  The patient is set up to go to the rehab hospital.  We will continue with   current medications.  We will continue to wean the oxygen as tolerated.        ______________________________  Yoni Hale MD    KAVITA/AQS  DD:  07/22/2022  Time:  07:41PM  DT:  07/22/2022  Time:  07:54PM  Job #:   [id number]      PROGRESS NOTE

## 2022-07-24 PROCEDURE — 25000003 PHARM REV CODE 250: Performed by: INTERNAL MEDICINE

## 2022-07-24 PROCEDURE — 94799 UNLISTED PULMONARY SVC/PX: CPT

## 2022-07-24 PROCEDURE — 63600175 PHARM REV CODE 636 W HCPCS: Performed by: SPECIALIST

## 2022-07-24 PROCEDURE — 27000207 HC ISOLATION

## 2022-07-24 PROCEDURE — 25000003 PHARM REV CODE 250: Performed by: SPECIALIST

## 2022-07-24 PROCEDURE — 11000001 HC ACUTE MED/SURG PRIVATE ROOM

## 2022-07-24 PROCEDURE — 94761 N-INVAS EAR/PLS OXIMETRY MLT: CPT

## 2022-07-24 PROCEDURE — 27000221 HC OXYGEN, UP TO 24 HOURS

## 2022-07-24 RX ORDER — AMLODIPINE BESYLATE 5 MG/1
10 TABLET ORAL ONCE
Status: COMPLETED | OUTPATIENT
Start: 2022-07-24 | End: 2022-07-24

## 2022-07-24 RX ADMIN — ACETAMINOPHEN 500 MG: 500 TABLET, FILM COATED ORAL at 08:07

## 2022-07-24 RX ADMIN — HYDRALAZINE HYDROCHLORIDE 25 MG: 25 TABLET ORAL at 08:07

## 2022-07-24 RX ADMIN — LISINOPRIL 20 MG: 20 TABLET ORAL at 09:07

## 2022-07-24 RX ADMIN — ONDANSETRON 4 MG: 2 INJECTION INTRAMUSCULAR; INTRAVENOUS at 05:07

## 2022-07-24 RX ADMIN — METHOCARBAMOL 500 MG: 500 TABLET ORAL at 12:07

## 2022-07-24 RX ADMIN — SODIUM CHLORIDE: 9 INJECTION, SOLUTION INTRAVENOUS at 02:07

## 2022-07-24 RX ADMIN — RIVAROXABAN 10 MG: 10 TABLET, FILM COATED ORAL at 04:07

## 2022-07-24 RX ADMIN — ACETAMINOPHEN 500 MG: 500 TABLET, FILM COATED ORAL at 05:07

## 2022-07-24 RX ADMIN — ACETAMINOPHEN 500 MG: 500 TABLET, FILM COATED ORAL at 09:07

## 2022-07-24 RX ADMIN — TRAMADOL HYDROCHLORIDE 50 MG: 50 TABLET, COATED ORAL at 09:07

## 2022-07-24 RX ADMIN — SODIUM CHLORIDE: 9 INJECTION, SOLUTION INTRAVENOUS at 10:07

## 2022-07-24 RX ADMIN — AMLODIPINE BESYLATE 10 MG: 5 TABLET ORAL at 07:07

## 2022-07-24 RX ADMIN — LISINOPRIL 20 MG: 20 TABLET ORAL at 08:07

## 2022-07-24 RX ADMIN — SODIUM CHLORIDE: 9 INJECTION, SOLUTION INTRAVENOUS at 03:07

## 2022-07-24 RX ADMIN — LEVOTHYROXINE SODIUM 50 MCG: 50 TABLET ORAL at 05:07

## 2022-07-24 RX ADMIN — ACETAMINOPHEN 500 MG: 500 TABLET, FILM COATED ORAL at 03:07

## 2022-07-24 RX ADMIN — METOPROLOL SUCCINATE 50 MG: 50 TABLET, EXTENDED RELEASE ORAL at 08:07

## 2022-07-24 RX ADMIN — HYDRALAZINE HYDROCHLORIDE 25 MG: 25 TABLET ORAL at 09:07

## 2022-07-24 RX ADMIN — FAMOTIDINE 20 MG: 20 TABLET, FILM COATED ORAL at 08:07

## 2022-07-24 NOTE — PLAN OF CARE
Problem: Adult Inpatient Plan of Care  Goal: Plan of Care Review  Outcome: Ongoing, Progressing  Flowsheets (Taken 7/23/2022 2216)  Plan of Care Reviewed With: patient  Goal: Patient-Specific Goal (Individualized)  Outcome: Ongoing, Progressing  Flowsheets (Taken 7/23/2022 2216)  Patient-Specific Goals (Include Timeframe): to be be able to go home  Goal: Absence of Hospital-Acquired Illness or Injury  Outcome: Ongoing, Progressing  Intervention: Prevent Skin Injury  Flowsheets (Taken 7/23/2022 2000)  Body Position:   supine   turned

## 2022-07-24 NOTE — PROGRESS NOTES
A 89-year-old  American female.  She is doing fairly good except for   pain in her surgical area.  She is set up to start or rehab on Monday.  She   denies any shortness of breath, chest pain, palpitations, headaches, or any   other problems.  I spoke with Hem-Onc and breast surgeon, and she is going to   have her breast biopsy as outpatient.  No other significant issues at the   present time.  She has been afebrile.  Her sats have been in the low 90s on   nasal cannula.     Looks more dry yesterday staryed on ivf, much alert this am  bp high -added [facility]          REVIEW OF SYSTEMS:  X12 as above.    PHYSICAL EXAMINATION:  VITAL SIGNS:  Last blood pressure is 149/78, pulse 90, temp 98.4.  Vitals:    07/24/22 0405 07/24/22 0715 07/24/22 0839 07/24/22 1143   BP: (!) 169/79 (!) 146/82 (!) 165/90 107/61   Pulse: 68 76  75   Resp: 18      Temp: 97.6 °F (36.4 °C) 97.8 °F (36.6 °C)  97.5 °F (36.4 °C)   TempSrc: Oral Oral  Oral   SpO2: 96% 95%  (!) 94%   Weight:       Height:           GENERAL:  Patient is alert, no acute distress.    HEART:  Regular rhythm and rate.  LUNGS:  Clear.    ABDOMEN:  Soft, nontender.    EXTREMITIES:  No clubbing, cyanosis.  Trace edema.  NEUROLOGIC:  Nonfocal.    LABORATORY STUDIES:    Lab Results   Component Value Date    WBC 11.5 07/21/2022    HGB 9.7 (L) 07/21/2022    HCT 30.0 (L) 07/21/2022    MCV 91.2 07/21/2022     07/21/2022       Recent Labs   Lab 07/21/22  1049   *   K 4.8   CO2 19*   BUN 23.5*   CREATININE 0.96   GLUCOSE 104   CALCIUM 8.2*         IMPRESSION:    1. Status post fall with left hip fracture with open reduction and internal   fixation.  2. Right breast mass, possible cancer with metastasis.  3. Hypertension.  4. Dyslipidemia.  5. Osteoarthritis.  6. Hypothyroidism.  7. Mild postop anemia.    PLAN:  Continue therapy  Pain control  Labs as needed  Gi and dvt ppx  Rehabs oon

## 2022-07-25 ENCOUNTER — HOSPITAL ENCOUNTER (OUTPATIENT)
Dept: RADIOLOGY | Facility: HOSPITAL | Age: 87
Discharge: HOME | End: 2022-07-25
Attending: INTERNAL MEDICINE
Payer: MEDICARE

## 2022-07-25 PROCEDURE — 25000003 PHARM REV CODE 250: Performed by: INTERNAL MEDICINE

## 2022-07-25 PROCEDURE — 71045 X-RAY EXAM CHEST 1 VIEW: CPT | Mod: TC

## 2022-07-25 PROCEDURE — 63600175 PHARM REV CODE 636 W HCPCS: Performed by: ANESTHESIOLOGY

## 2022-07-25 PROCEDURE — 27000221 HC OXYGEN, UP TO 24 HOURS

## 2022-07-25 PROCEDURE — 94640 AIRWAY INHALATION TREATMENT: CPT

## 2022-07-25 PROCEDURE — 63600175 PHARM REV CODE 636 W HCPCS: Performed by: INTERNAL MEDICINE

## 2022-07-25 PROCEDURE — 94761 N-INVAS EAR/PLS OXIMETRY MLT: CPT

## 2022-07-25 PROCEDURE — 25000242 PHARM REV CODE 250 ALT 637 W/ HCPCS: Performed by: INTERNAL MEDICINE

## 2022-07-25 PROCEDURE — 25000003 PHARM REV CODE 250: Performed by: SPECIALIST

## 2022-07-25 PROCEDURE — 99233 SBSQ HOSP IP/OBS HIGH 50: CPT | Mod: GC,,, | Performed by: INTERNAL MEDICINE

## 2022-07-25 PROCEDURE — 63600175 PHARM REV CODE 636 W HCPCS: Performed by: SPECIALIST

## 2022-07-25 PROCEDURE — 11000001 HC ACUTE MED/SURG PRIVATE ROOM

## 2022-07-25 PROCEDURE — 99233 PR SUBSEQUENT HOSPITAL CARE,LEVL III: ICD-10-PCS | Mod: GC,,, | Performed by: INTERNAL MEDICINE

## 2022-07-25 PROCEDURE — 92610 EVALUATE SWALLOWING FUNCTION: CPT

## 2022-07-25 RX ORDER — FUROSEMIDE 10 MG/ML
20 INJECTION INTRAMUSCULAR; INTRAVENOUS
Status: DISCONTINUED | OUTPATIENT
Start: 2022-07-26 | End: 2022-08-02 | Stop reason: HOSPADM

## 2022-07-25 RX ORDER — ENOXAPARIN SODIUM 100 MG/ML
30 INJECTION SUBCUTANEOUS EVERY 24 HOURS
Status: DISCONTINUED | OUTPATIENT
Start: 2022-07-26 | End: 2022-07-27

## 2022-07-25 RX ORDER — CLONIDINE HYDROCHLORIDE 0.1 MG/1
0.1 TABLET ORAL
Status: DISCONTINUED | OUTPATIENT
Start: 2022-07-25 | End: 2022-08-02 | Stop reason: HOSPADM

## 2022-07-25 RX ORDER — METHYLPREDNISOLONE SOD SUCC 125 MG
125 VIAL (EA) INJECTION ONCE
Status: COMPLETED | OUTPATIENT
Start: 2022-07-25 | End: 2022-07-25

## 2022-07-25 RX ORDER — FUROSEMIDE 10 MG/ML
40 INJECTION INTRAMUSCULAR; INTRAVENOUS ONCE
Status: COMPLETED | OUTPATIENT
Start: 2022-07-25 | End: 2022-07-25

## 2022-07-25 RX ORDER — FUROSEMIDE 10 MG/ML
20 INJECTION INTRAMUSCULAR; INTRAVENOUS ONCE
Status: COMPLETED | OUTPATIENT
Start: 2022-07-25 | End: 2022-07-25

## 2022-07-25 RX ORDER — IPRATROPIUM BROMIDE AND ALBUTEROL SULFATE 2.5; .5 MG/3ML; MG/3ML
3 SOLUTION RESPIRATORY (INHALATION) EVERY 4 HOURS PRN
Status: DISCONTINUED | OUTPATIENT
Start: 2022-07-25 | End: 2022-08-02 | Stop reason: HOSPADM

## 2022-07-25 RX ADMIN — ACETAMINOPHEN 500 MG: 500 TABLET, FILM COATED ORAL at 10:07

## 2022-07-25 RX ADMIN — LISINOPRIL 20 MG: 20 TABLET ORAL at 08:07

## 2022-07-25 RX ADMIN — ACETAMINOPHEN 500 MG: 500 TABLET, FILM COATED ORAL at 11:07

## 2022-07-25 RX ADMIN — ONDANSETRON 4 MG: 2 INJECTION INTRAMUSCULAR; INTRAVENOUS at 05:07

## 2022-07-25 RX ADMIN — IPRATROPIUM BROMIDE AND ALBUTEROL SULFATE 3 ML: .5; 3 SOLUTION RESPIRATORY (INHALATION) at 03:07

## 2022-07-25 RX ADMIN — ONDANSETRON 4 MG: 2 INJECTION INTRAMUSCULAR; INTRAVENOUS at 12:07

## 2022-07-25 RX ADMIN — ACETAMINOPHEN 500 MG: 500 TABLET, FILM COATED ORAL at 12:07

## 2022-07-25 RX ADMIN — SENNOSIDES AND DOCUSATE SODIUM 2 TABLET: 50; 8.6 TABLET ORAL at 08:07

## 2022-07-25 RX ADMIN — IPRATROPIUM BROMIDE AND ALBUTEROL SULFATE 3 ML: .5; 3 SOLUTION RESPIRATORY (INHALATION) at 12:07

## 2022-07-25 RX ADMIN — HYDRALAZINE HYDROCHLORIDE 25 MG: 25 TABLET ORAL at 10:07

## 2022-07-25 RX ADMIN — POLYETHYLENE GLYCOL 3350 17 G: 17 POWDER, FOR SOLUTION ORAL at 10:07

## 2022-07-25 RX ADMIN — FUROSEMIDE 20 MG: 10 INJECTION, SOLUTION INTRAMUSCULAR; INTRAVENOUS at 05:07

## 2022-07-25 RX ADMIN — MELATONIN TAB 3 MG 6 MG: 3 TAB at 12:07

## 2022-07-25 RX ADMIN — ONDANSETRON 4 MG: 2 INJECTION INTRAMUSCULAR; INTRAVENOUS at 01:07

## 2022-07-25 RX ADMIN — METOPROLOL SUCCINATE 50 MG: 50 TABLET, EXTENDED RELEASE ORAL at 10:07

## 2022-07-25 RX ADMIN — CLONIDINE HYDROCHLORIDE 0.1 MG: 0.1 TABLET ORAL at 01:07

## 2022-07-25 RX ADMIN — TRAMADOL HYDROCHLORIDE 50 MG: 50 TABLET, COATED ORAL at 08:07

## 2022-07-25 RX ADMIN — ACETAMINOPHEN 500 MG: 500 TABLET, FILM COATED ORAL at 05:07

## 2022-07-25 RX ADMIN — METHYLPREDNISOLONE SODIUM SUCCINATE 125 MG: 125 INJECTION, POWDER, FOR SOLUTION INTRAMUSCULAR; INTRAVENOUS at 01:07

## 2022-07-25 RX ADMIN — FUROSEMIDE 20 MG: 10 INJECTION INTRAMUSCULAR; INTRAVENOUS at 11:07

## 2022-07-25 RX ADMIN — FUROSEMIDE 40 MG: 10 INJECTION, SOLUTION INTRAMUSCULAR; INTRAVENOUS at 02:07

## 2022-07-25 RX ADMIN — ACETAMINOPHEN 500 MG: 500 TABLET, FILM COATED ORAL at 08:07

## 2022-07-25 RX ADMIN — HYDRALAZINE HYDROCHLORIDE 25 MG: 25 TABLET ORAL at 08:07

## 2022-07-25 RX ADMIN — POLYETHYLENE GLYCOL 3350 17 G: 17 POWDER, FOR SOLUTION ORAL at 08:07

## 2022-07-25 RX ADMIN — LISINOPRIL 20 MG: 20 TABLET ORAL at 10:07

## 2022-07-25 RX ADMIN — METHOCARBAMOL 500 MG: 500 TABLET ORAL at 12:07

## 2022-07-25 RX ADMIN — TRAMADOL HYDROCHLORIDE 50 MG: 50 TABLET, COATED ORAL at 02:07

## 2022-07-25 RX ADMIN — MELATONIN TAB 3 MG 6 MG: 3 TAB at 08:07

## 2022-07-25 RX ADMIN — METHOCARBAMOL 500 MG: 500 TABLET ORAL at 02:07

## 2022-07-25 RX ADMIN — LEVOTHYROXINE SODIUM 50 MCG: 50 TABLET ORAL at 05:07

## 2022-07-25 NOTE — PT/OT/SLP PROGRESS
Physical Therapy      Patient Name:  Leidy Hdez   MRN:  [id number]    Patient not seen today secondary to RN reporting that pt is being w/u for fluid overload and pt did not rest at all last night per the son. Will follow-up as schedule permits.

## 2022-07-25 NOTE — PROGRESS NOTES
[facility]  Adult Nutrition  Progress Note    SUMMARY       Recommendations    Recommendation/Intervention: - continue diet per SLP recs  - will swtich oral supplement to boost plus (vanilla or strawberry flavor is pt preference)  - continue medical management of n/v  Goals: - meet >75% est energy needs by follow up  Nutrition Goal Status: new    Assessment and Plan    Nutrition Problem  Inadequate Oral Intake    Related to (etiology):   n/v    Signs and Symptoms (as evidenced by):   <25% intake of meals    Interventions(treatment strategy):  Modified composition of meals / snacks, Commercial beverage and Collaboration with other providers    Nutrition Diagnosis Status:   New          Malnutrition Assessment          Malnutrition in the context of acute illness or injury    Degree of Malnutrition:  Unable to Complete  Energy Intake:  unable to obtain  Interpretation of Weight Loss:  unable to obtain  Body Fat: unable to obtain  Area of Body Fat Loss:  unable to obtain  Muscle Mass Loss:  unable to obtain  Area of Muscle Mass Loss: unable to obtain  Fluid Accumulation:  unable to obtain  Edema:  unable to obtain   Reduced  Strength:  not applicable    A minimum of two characteristics is recommended for diagnosis of either severe or non-severe malnutrition.                                   Reason for Assessment    Reason For Assessment: RD follow-up  Diagnosis: other (see comments) (1. Status post fall with left hip fracture with open reduction and internal   fixation.  2. Right breast mass, possible cancer with metastasis.  3. Hypertension.  4. Dyslipidemia.  5. Osteoarthritis.  6. Hypothyroidism.  7. Mild postop anemia.)  General Information Comments: 7/25: not feeling well today, nauseated and not eating much, usually has a pretty good appetite; son reports she drinks protein shakes at home; physical assessment not appropriate at this time    Nutrition Risk Screen    Nutrition Risk  "Screen: no indicators present    Nutrition/Diet History    Spiritual, Cultural Beliefs, Alevism Practices, Values that Affect Care: no  Factors Affecting Nutritional Intake: nausea/vomiting    Anthropometrics    Temp: 97.2 °F (36.2 °C)  Height Method: Estimated  Height: 5' 2.99" (160 cm)  Height (inches): 62.99 in  Weight Method: Bed Scale  Weight: 49.8 kg (109 lb 12.6 oz)  Weight (lb): 109.79 lb  Ideal Body Weight (IBW), Female: 114.95 lb  % Ideal Body Weight, Female (lb): 95.51 %  BMI (Calculated): 19.5  BMI Grade: other (see comments) (<22 if > 65 years)       Lab/Procedures/Meds    Pertinent Labs Comments: 7/25: no recent labs  Pertinent Medications Comments: ondansetron, lactulose, furosemide, famotidine, calcium carbonate, docusate    Physical Findings/Assessment         Estimated/Assessed Needs    Weight Used For Calorie Calculations: 49.8 kg (109 lb 12.6 oz)  Energy Calorie Requirements (kcal): 1245-1494kcal (25-30 kcal/kg)  Energy Need Method: Kcal/kg  Protein Requirements: 70 gm (1.4 gm/kg)  Weight Used For Protein Calculations: 49.8 kg (109 lb 12.6 oz)        RDA Method (mL): 1245         Nutrition Prescription Ordered    Current Diet Order: soft and bite sized    Evaluation of Received Nutrient/Fluid Intake    Energy Calories Required: not meeting needs  Protein Required: not meeting needs  Tolerance: not tolerating  % Intake of Estimated Energy Needs: 0 - 25 %  % Meal Intake: 0 - 25 %    Nutrition Risk    Level of Risk/Frequency of Follow-up: moderate     Monitor and Evaluation    Food and Nutrient Intake: food and beverage intake  Anthropometric Measurements: weight change     Nutrition Follow-Up    RD Follow-up?: Yes    "

## 2022-07-25 NOTE — PT/OT/SLP EVAL
Speech Language Pathology Department  Clinical Swallow Evaluation    Patient Name:  Leidy Hdez   MRN:  [id number]  Admitting Diagnosis: Nondisplaced intertrochanteric fracture of left femur, init    Recommendations:     General Recommendations:  SLP follow up with nursing x1 regarding diet tolerace  Diet recommendations:  Soft & Bite Sized Diet - IDDSI Level 6, Liquid Diet Level: Thin   Swallow Strategies/Precautions: small bites/sips, slow rate, NO straws, Supervision with meals, Assist with feeding as needed and medications crushed/whole in puree  General Precautions: Standard, aspiration    History:     No past medical history on file.    Past Surgical History:   Procedure Laterality Date    INTRAMEDULLARY RODDING OF FEMUR Left 7/19/2022    Procedure: INSERTION, INTRAMEDULLARY VERNELL, FEMUR;  Surgeon: Antonio Matthew MD;  Location: [facility];  Service: Orthopedics;  Laterality: Left;     Chest X-Rays: pending    Home Diet: Regular and thin liquids  Current Method of Nutrition: NPO     Pt son reports vomiting overnight.  Possible aspiration event.    Subjective     Patient awake, alert and restless.    Patient goals: to eat/drink     Pain/Comfort: Pain Rating 1: 0/10    Respiratory Status: nasal cannula    Objective:     Oral Musculature Evaluation  · Dentition: scattered dentition  · Secretion Management: adequate  · Voice Prior to PO Intake: clear    Consistency Fed By Oral Symptoms Pharyngeal Symptoms   Thin liquid by spoon SLP None None   Thin liquid by straw SLP None Cough after swallow   Puree SLP None None   *Pt refused additional trials of PO intake    Assessment:     Pt presents with baseline oral dysphagia secondary to scattered dentition.  PO trials limited due to patient refusal.  OK to resume previous diet.  Nursing to observe next meal.  SLP to follow up x1 and complete additional assessments as warranted.    Plan:     Plan of Care reviewed with:  patient, spouse   SLP Follow-Up:  Yes      Time  Tracking:     SLP Treatment Date:   07/25/22  Speech Start Time:  1015  Speech Stop Time:  1025     Speech Total Time (min):  10 min    Billable minutes:  Swallow and Oral Function Evaluation, 10 minutes     07/25/2022

## 2022-07-25 NOTE — CONSULTS
[facility]  Pulmonary Critical Care Note    Patient Name: Leidy Hdez  MRN: [id number]  Admission Date: 7/18/2022  Hospital Length of Stay: 7 days  Code Status: Prior  Attending Provider: Yoni Hale MD  Primary Care Provider: Yoni Hale MD     Subjective:     HPI:   This is a 89-year-old  female who presented to the ED on 7/18/2022 after a fall while getting out of the shower. She sustained a nondisplaced fracture of the left intertrochanteric femur. CTA of chest with lobulated mass in the right breast suspicious for malignancy and lobulated right perihilar soft tissue thickening suspicious for malignancy with metastatic mediastinal adenopathy. Mass has been present for over one year.    Hospital Course/Significant events:  ORIF of left hip 7/19/2022  Oncology consulted. Recommend biopsy of right axillary mass. She is not a candidate for chemotherapy. Work up to be done as outpatient.    24 Hour Interval History:  Pulmonology being consulted for evaluation of right pleural effusion.  Received 20 mg of lasix this am and another 40 this afternoon. Also receiving 125 mg of solumedrol today.      No past medical history on file.    Past Surgical History:   Procedure Laterality Date    INTRAMEDULLARY RODDING OF FEMUR Left 7/19/2022    Procedure: INSERTION, INTRAMEDULLARY VERNELL, FEMUR;  Surgeon: Antonio Matthew MD;  Location: [facility];  Service: Orthopedics;  Laterality: Left;       Social History     Socioeconomic History    Marital status:            Objective:     Current Outpatient Medications   Medication Instructions    atorvastatin (LIPITOR) 10 mg, Oral, Daily    hydrALAZINE (APRESOLINE) 50 MG tablet TAKE 1/2 (ONE-HALF) TABLET BY MOUTH TWICE DAILY. IF BLOOD PRESSURE AVERAGE IS GREATEER THAN 130/80 AFTER 2 DAYS, INCREASE TO 1 TABLET TWICE DAILY. IF BLOOD PRESSURE AVERAGE IS GREATER THAN 130/80 AFTER 2 DAYS, INCREASE TO 2 TABLETS TWICE DAILY     levothyroxine (SYNTHROID) 50 mcg, Oral, Daily    lisinopriL (PRINIVIL,ZESTRIL) 20 mg, Oral, 2 times daily    metoprolol succinate (TOPROL-XL) 50 mg, Oral, Daily       Current Inpatient Medications   acetaminophen  500 mg Oral 6 times per day    famotidine  20 mg Oral Daily    furosemide (LASIX) injection  40 mg Intravenous Once    hydrALAZINE  25 mg Oral Q12H    levothyroxine  50 mcg Oral Daily    lisinopriL  20 mg Oral BID    metoprolol succinate  50 mg Oral Daily    polyethylene glycol  17 g Oral BID    rivaroxaban  10 mg Oral Daily with dinner    senna-docusate 8.6-50 mg  2 tablet Oral QHS           Intake/Output Summary (Last 24 hours) at 7/25/2022 1408  Last data filed at 7/25/2022 1200  Gross per 24 hour   Intake 1175 ml   Output 1800 ml   Net -625 ml       Review of Systems   All other systems reviewed and are negative.       Vital Signs (Most Recent):  Temp: 97.2 °F (36.2 °C) (07/25/22 1210)  Pulse: 89 (07/25/22 1210)  Resp: 18 (07/25/22 1210)  BP: (!) 178/89 (07/25/22 1210)  SpO2: 96 % (07/25/22 1210)  Body mass index is 19.49 kg/m².  Weight: 49.9 kg (110 lb) Vital Signs (24h Range):  Temp:  [97.2 °F (36.2 °C)-97.7 °F (36.5 °C)] 97.2 °F (36.2 °C)  Pulse:  [79-95] 89  Resp:  [17-21] 18  SpO2:  [90 %-96 %] 96 %  BP: (134-178)/(70-91) 178/89     Physical Exam  Constitutional:       Appearance: She is ill-appearing.   HENT:      Head: Normocephalic and atraumatic.   Cardiovascular:      Rate and Rhythm: Normal rate.   Pulmonary:      Effort: Tachypnea present.      Breath sounds: Wheezing and rhonchi present.   Neurological:      Mental Status: She is alert.           Mechanical ventilation support:   N/A    Lines/Drains/Airways     Peripheral Intravenous Line  Duration                Peripheral IV - Single Lumen 07/23/22 0800 22 G Anterior;Left Upper Arm 2 days                Significant Labs:    Lab Results   Component Value Date    WBC 11.5 07/21/2022    HGB 9.7 (L) 07/21/2022    HCT 30.0 (L)  07/21/2022    MCV 91.2 07/21/2022     07/21/2022         BMP  Lab Results   Component Value Date     (L) 07/21/2022    K 4.8 07/21/2022    CO2 19 (L) 07/21/2022    BUN 23.5 (H) 07/21/2022    CREATININE 0.96 07/21/2022    CALCIUM 8.2 (L) 07/21/2022    EGFRNONAA 57 07/21/2022       ABG  No results for input(s): PH, PO2, PCO2, HCO3, BE in the last 168 hours.        Significant Imaging:  I have reviewed all pertinent imaging within the past 24 hours.        Assessment/Plan:     Assessment  1. Right pleural effusion  2. Right breast mass with associated adenopathy; likely malignant  3. Femur fracture s/t fall s/p ORIF      Plan  Continue supplemental oxygen, wean as tolerated  Will discuss with MD regarding possible thoracentesis if candidate. Would need to hold Xarelto.   Continue DuoNebs as needed  Will discuss starting steroids       RAMOS Salazar  Pulmonary Critical Care Medicine  [facility]

## 2022-07-25 NOTE — PHYSICIAN QUERY
PT Name: Leidy Hdez  MR #: [id number]    DOCUMENTATION CLARIFICATION      CDS/: Erin Nettles RN, CDS   Contact Information: [email or web address]    This form is a permanent document in the medical record.      Query Date: July 25, 2022    By submitting this query, we are merely seeking further clarification of documentation. Please utilize your independent clinical judgment when addressing the question(s) below.    The Medical Record contains the following:   Indicators  Supporting Clinical Findings Location in Medical Record   x Anemia documented Mild postoperative anemia.   Internal Medicine 7/20   x H&H H&H   12.6/38.4  H&H   11.6/36.3  H&H     9.7/30.0 Labs  7/18  Labs  7/19  Labs  7/21   x BP                    HR BP: ()/(59-86)   Pulse:  [64-82]  BP: 108/67     Pulse: 75  BP: (110-174)/(61-92)  Orthopedic surgery 7/19  Orthopedic surgery 7/20  Hematology/oncology 7/21    GI bleeding documented      Acute bleeding (Non GI site)      Transfusion(s)     x Acute/Chronic illness hypertension ,valvular disease. hypothyroidism and osteoarthritis.Mild dehydration   Mild postoperative anemia. Status post fall with a left intertrochanteric fracture. Internal Medicine 7/20    Treatments     x Other INSERTION, INTRAMEDULLARY VERNELL, FEMUR (Left)     ORIF left hip intertrochanteric femur fracture  EBL 50 mL OP note 7/19     Provider, please specify diagnosis or diagnoses associated with above clinical findings.   [   x] Acute blood loss anemia expected post-operatively    [   ] Precipitous drop in Hematocrit   [   ] Other Hematological Diagnosis (please specify): _________________   [   ] Clinically Undetermined            Please document in your progress notes daily for the duration of treatment, until resolved, and include in your discharge summary.    Form No. [id number]

## 2022-07-25 NOTE — PT/OT/SLP PROGRESS
Occupational Therapy      Patient Name:  Leidy Hdez   MRN:  [id number]    Per RN, pt not appropriate for therapy at this time d/t hypertension and fluid overload. Will follow-up as appropriate.    7/25/2022

## 2022-07-25 NOTE — PLAN OF CARE
Pt will not have biopsy until after acute rehab. She is having issues today that prevent dc to [facility]. One is fluid overload. Update sent  Anticipate [facility] when stable.

## 2022-07-25 NOTE — PLAN OF CARE
Problem: Adult Inpatient Plan of Care  Goal: Plan of Care Review  Outcome: Ongoing, Progressing  Goal: Patient-Specific Goal (Individualized)  Outcome: Ongoing, Progressing  Goal: Absence of Hospital-Acquired Illness or Injury  Outcome: Ongoing, Progressing  Goal: Optimal Comfort and Wellbeing  Outcome: Ongoing, Progressing  Goal: Readiness for Transition of Care  Outcome: Ongoing, Progressing      Statement Selected

## 2022-07-25 NOTE — PROGRESS NOTES
"Progress Note  Hematology/Oncology      Consult Requested By: Yoni Hale MD    Reason for Consult: Breast mass    SUBJECTIVE:     History of Present Illness:  Patient is a 89 y.o.  female presents after a fall.  I speak Niuean therefore I did not need an .  She sustained a nondisplaced fracture to the left femur and is status post ORIF of the left hip intertrochanteric femur fracture.     CTA of the chest with no evidence of pulmonary embolism but showed enlarged paratracheal, prevascular and subcarinal lymphadenopathy.  Subcarinal lymph node measures 3.2 x 2.2 cm.  Right perihilar area of soft tissue thickening measuring 3.3 cm suspicious for malignancy.  Small right pleural effusion.  Lobulated mass in the right breast measuring 3.7 x 2.6 cm.     Patient reports that about a year ago she fell and hit her right breast.  She felt a mass but she reports that has been the same size since then.  She report sister diagnosed with breast cancer in in her 80s.    Patient is seen today in rounds and she is lying in bed.  Son at bedside.    7/22/2022: Patient is seen in rounds this morning. Son at bedside. He told me that after I left she start crying because of the word "cancer". He does not want me to use the word Cancer in front of her. She is doing good today. She feels better. She told me that she exercise  this morning. NO fever, chills, sweats.    7/25/2022: Patient is seen in rounds this morning. She reports that she is not doing so good today. She reports some nausea and abdominal discomfort. She reports chest pain and some shortness of breath. Noticed mild tachypnea but no distress. She was seen by Dr Hale this AM and CXR today Cardiopericardial silhouette is within normal limits.  There is interval size increase of right pleural effusion and further right lung atelectasis.  Left lung is clear.  No apparent pneumothorax. Her blood pressure is little more elevated this morning. "     Continuous Infusions:    Scheduled Meds:   acetaminophen  500 mg Oral 6 times per day    famotidine  20 mg Oral Daily    hydrALAZINE  25 mg Oral Q12H    levothyroxine  50 mcg Oral Daily    lisinopriL  20 mg Oral BID    metoprolol succinate  50 mg Oral Daily    polyethylene glycol  17 g Oral BID    rivaroxaban  10 mg Oral Daily with dinner    senna-docusate 8.6-50 mg  2 tablet Oral QHS     PRN Meds:albuterol-ipratropium, aluminum-magnesium hydroxide-simethicone, calcium carbonate, diphenhydrAMINE, HYDROmorphone, lactulose, melatonin, methocarbamoL, ondansetron, ondansetron, traMADoL    No past medical history on file.  Past Surgical History:   Procedure Laterality Date    INTRAMEDULLARY RODDING OF FEMUR Left 7/19/2022    Procedure: INSERTION, INTRAMEDULLARY VERNELL, FEMUR;  Surgeon: Antonio Matthew MD;  Location: [facility];  Service: Orthopedics;  Laterality: Left;     No family history on file.       Review of patient's allergies indicates:   Allergen Reactions    Atropine     Diazepam     Promethazine       No current facility-administered medications on file prior to encounter.     Current Outpatient Medications on File Prior to Encounter   Medication Sig Dispense Refill    atorvastatin (LIPITOR) 10 MG tablet Take 10 mg by mouth once daily.      hydrALAZINE (APRESOLINE) 50 MG tablet TAKE 1/2 (ONE-HALF) TABLET BY MOUTH TWICE DAILY. IF BLOOD PRESSURE AVERAGE IS GREATEER THAN 130/80 AFTER 2 DAYS, INCREASE TO 1 TABLET TWICE DAILY. IF BLOOD PRESSURE AVERAGE IS GREATER THAN 130/80 AFTER 2 DAYS, INCREASE TO 2 TABLETS TWICE DAILY      levothyroxine (SYNTHROID) 50 MCG tablet Take 50 mcg by mouth once daily.      lisinopriL (PRINIVIL,ZESTRIL) 20 MG tablet Take 20 mg by mouth 2 (two) times daily.      metoprolol succinate (TOPROL-XL) 50 MG 24 hr tablet Take 50 mg by mouth once daily.         Review of Systems   Constitutional: Negative for activity change, appetite change, chills, fatigue, fever and unexpected  weight change.   HENT: Negative for mouth dryness, mouth sores, nosebleeds, sore throat and trouble swallowing.    Eyes: Negative for visual disturbance.   Respiratory: Negative for cough and shortness of breath.    Cardiovascular: Negative for chest pain, palpitations and leg swelling.   Gastrointestinal: Negative for abdominal distention, abdominal pain, blood in stool, change in bowel habit, constipation, diarrhea, nausea, vomiting and change in bowel habit.   Endocrine: Negative.    Genitourinary: Negative for dysuria, frequency, hematuria and urgency.   Musculoskeletal: Negative for arthralgias, back pain, myalgias and neck pain.   Integumentary:  Negative for rash, breast mass, breast discharge and breast tenderness.   Neurological: Negative for dizziness, tremors, syncope, speech difficulty, weakness, light-headedness, numbness, headaches and memory loss.   Hematological: Does not bruise/bleed easily.   Psychiatric/Behavioral: Negative for confusion and suicidal ideas.   Breast: Negative for mass and tenderness        OBJECTIVE:     Vital Signs (Most Recent)  Temp: 97.2 °F (36.2 °C) (07/25/22 1210)  Pulse: 89 (07/25/22 1210)  Resp: 17 (07/25/22 1137)  BP: (!) 178/89 (07/25/22 1210)  SpO2: (!) 90 % (07/25/22 1137)    Pain Assessment: No pain reported at this time    Vital Signs Range (Last 24H):  Temp:  [97.2 °F (36.2 °C)-97.7 °F (36.5 °C)]   Pulse:  [79-95]   Resp:  [17-21]   BP: (134-178)/(70-91)   SpO2:  [90 %-94 %]     Physical Exam:  Physical Exam  Vitals and nursing note reviewed.   Constitutional:       General: She is not in acute distress.     Appearance: She is ill-appearing.      Comments: Thin and frail looking   HENT:      Head: Normocephalic and atraumatic.      Mouth/Throat:      Mouth: Mucous membranes are moist.   Eyes:      General: No scleral icterus.     Extraocular Movements: Extraocular movements intact.      Conjunctiva/sclera: Conjunctivae normal.      Pupils: Pupils are equal, round,  and reactive to light.   Neck:      Vascular: No JVD.   Cardiovascular:      Rate and Rhythm: Normal rate and regular rhythm.      Heart sounds: No murmur heard.  Pulmonary:      Effort: Tachypnea present.      Breath sounds: Decreased breath sounds present.      Comments: Mild tachypnea but NAD.   Chest:      Chest wall: No deformity or tenderness.   Breasts:      Right: No swelling, mass, nipple discharge, skin change, tenderness, axillary adenopathy or supraclavicular adenopathy.      Left: Mass present. No nipple discharge, skin change, tenderness, axillary adenopathy or supraclavicular adenopathy.        Comments: Patient has a 2-3 cm hard fixed mass in the tail of the right breast.  She has a 2nd mass mostly right axillary area that is very large fixed and hard.  Abdominal:      General: Bowel sounds are normal. There is no distension.      Palpations: Abdomen is soft. There is no mass.      Tenderness: There is no abdominal tenderness.   Musculoskeletal:         General: No swelling or deformity.      Cervical back: Neck supple.   Lymphadenopathy:      Cervical: No cervical adenopathy.      Upper Body:      Right upper body: No supraclavicular or axillary adenopathy.      Left upper body: No supraclavicular or axillary adenopathy.      Lower Body: No right inguinal adenopathy. No left inguinal adenopathy.   Skin:     General: Skin is warm.      Coloration: Skin is not jaundiced.      Findings: No lesion or rash.      Nails: There is no clubbing.   Neurological:      Mental Status: She is alert and oriented to person, place, and time.      Cranial Nerves: Cranial nerve deficit present.      Comments: Hearing deficit   Psychiatric:         Attention and Perception: Attention normal.         Mood and Affect: Mood and affect normal.         Speech: Speech normal.         Behavior: Behavior is cooperative.         Thought Content: Thought content normal.         Cognition and Memory: Cognition normal.          Judgment: Judgment normal.         Laboratory:  CBC with Differential:  No results for input(s): WBC, NEUTROPCT, LYMPH, MONOPCT, EOSPCT, BASOPHIL, RBC, HCT, HGB, MCV, MCH, RDW, PLT, MPV in the last 24 hours.    Invalid input(s): MCMC  CMP:  No results for input(s): GLU, CALCIUM, ALBUMIN, PROT, NA, K, CO2, CL, BUN, CREATININE, ALKPHOS, ALT, AST, BILITOT in the last 24 hours.  BMP:   No results for input(s): GLU, CALCIUM, NA, K, CO2, CL, BUN, CREATININE in the last 24 hours.   Latest Reference Range & Units 07/18/22 17:42   Alkaline Phosphatase 40 - 150 unit/L 76   PROTEIN TOTAL 5.8 - 7.6 gm/dL 6.4   Albumin 3.4 - 4.8 gm/dL 3.2 (L)   Albumin/Globulin Ratio 1.1 - 2.0 ratio 1.0 (L)   BILIRUBIN TOTAL <=1.5 mg/dL 0.9   AST 5 - 34 unit/L 23   ALT 0 - 55 unit/L 23   Globulin, Total 2.4 - 3.5 gm/dL 3.2   (L): Data is abnormally lowTumor Markers: No results for input(s): PSA, CEA, , AFPTM, [id number],  in the last 24 hours.    Invalid input(s): ALGTM  Immunology: No results for input(s): SPEP, FRANCES, ELIAN, FREELAMBDALI in the last 24 hours.  Coagulation: No results for input(s): PT, INR, APTT in the last 24 hours.  Specimen (24h ago, onward)            None        Microbiology Results (last 7 days)     Procedure Component Value Units Date/Time    Clostridium Diff Toxin, A & B, EIA [[id number]]  (Normal) Collected: 07/21/22 1523    Order Status: Completed Specimen: Stool Updated: 07/21/22 1610     Clostridium Difficile GDH Antigen Negative     Clostridium Difficile Toxin A/B Negative          Diagnostic Results:  Imaging Results          CTA Chest Non-Coronary (PE Study) (Final result)  Result time 07/18/22 20:30:20    Final result by Kodak Young MD (07/18/22 20:30:20)                 Impression:      1. No evidence of pulmonary embolism through the level of the subsegmental pulmonary arteries.  2. Lobulated mass in the right breast suspicious for malignancy.  3. Lobulated right perihilar soft tissue thickening suspicious  for malignancy with metastatic mediastinal adenopathy.      Electronically signed by: Kodak Young MD  Date:    07/18/2022  Time:    20:30             Narrative:    EXAMINATION:  CTA CHEST NON CORONARY    CLINICAL HISTORY:  Pulmonary embolism (PE) suspected, unknown D-dimer;    TECHNIQUE:  Axial images of the chest were obtained with IV contrast administration.  Coronal and sagittal reconstructions were provided.  Three dimensional and MIP images were obtained and evaluated.  Total DLP was 183 mGy-cm. Dose lowering technique and automated exposure control were utilized for this exam.    COMPARISON:  Chest radiograph 07/18/2022.    FINDINGS:  There is no filling defect within the pulmonary arteries through the level of the subsegmental pulmonary arteries.  There is no CT evidence of right heart strain.    The airway is widely patent.  There is enlarged paratracheal, prevascular, and subcarinal lymphadenopathy.  A representative subcarinal lymph node measures 3.2 x 2.2 cm.    There is a right perihilar area of soft tissue thickening measuring approximately 3.3 cm suspicious for malignancy.  There is a small right pleural effusion.  There is lower lobe atelectasis.    There is a lobulated mass in the right breast measuring 3.7 x 2.6 cm.  The upper abdomen demonstrates a small hiatal hernia.  There is no lytic or blastic osseous lesion.                               X-Ray Chest AP Portable (Final result)  Result time 07/18/22 18:26:37    Final result by Selwyn Pink MD (07/18/22 18:26:37)                 Impression:      Areas of hazy right lung opacity, greatest inferiorly.  Some of this may relate to overlying soft tissue, but pneumonia and right pleural effusion also possible.  Follow-up PA and lateral chest radiographs can be considered.      Electronically signed by: Selwyn Pink  Date:    07/18/2022  Time:    18:26             Narrative:    EXAMINATION:  XR CHEST AP PORTABLE    CLINICAL  HISTORY:  hypoxemia;    TECHNIQUE:  Frontal view(s) of the chest.    COMPARISON:  Radiography 01/09/2018    FINDINGS:  Hazy opacity in the lower right lung and extending peripherally over the mid and upper right lung.  No consolidation or significant pleural fluid on the left.  No definitive pneumothorax.  Cardiac silhouette within normal limits for technique.                               X-Ray Femur Ap/Lat Left (Final result)  Result time 07/18/22 18:09:34    Final result by Hayley Wilson MD (07/18/22 18:09:34)                 Impression:      Intertrochanteric fracture left hip      Electronically signed by: Hayley Wilson  Date:    07/18/2022  Time:    18:09             Narrative:    EXAMINATION:  XR FEMUR 2 VIEW LEFT    CLINICAL HISTORY:  Injury, unspecified, initial encounter    TECHNIQUE:  AP and lateral views of the left femur were performed.    COMPARISON:  None}    FINDINGS:  There is an inter trochanteric fracture of the left hip.  No significant displacement is seen.  No angulation is seen.  No other fractures are seen.                               CT Pelvis Without Contrast (Final result)  Result time 07/18/22 18:08:48    Final result by Hayley Wilson MD (07/18/22 18:08:48)                 Impression:      Intertrochanteric fracture seen on the left side as outlined above      Electronically signed by: Hayley Wilson  Date:    07/18/2022  Time:    18:08             Narrative:    EXAMINATION:  CT PELVIS WITHOUT CONTRAST    CLINICAL HISTORY:  Pelvic fracture;    TECHNIQUE:  Multiple axial images were obtained of the pelvis from the iliac crest to the pubic symphysis and sagittal and coronal reconstructions were performed.  No contrast was administered.    COMPARISON:  None    FINDINGS:  There is a fracture along the intertrochanteric region on the left side.  Fracture is not displaced.  There is comminution of the fracture at the level of the greater trochanter.  The ischium  appears to be intact on the right and left side.  Iliac bone is intact on the right and left side.  The right proximal femur appears to be intact.  The sacrum is intact.                                    ASSESSMENT/PLAN:     Patient Active Problem List   Diagnosis    Nondisplaced intertrochanteric fracture of left femur, init    Mass of axillary tail of right breast    Adenopathy        Right breast mass       Anemia       Pleural effusion    Plan  Recommend biopsy of the large right axillary mass.   If positive for breast cancer:   -if ER positive may benefit of endocrine therapy    -if ER negative I will not recommend any treatment at all due to her age and fragility.   She will not be able to tolerate chemotherapy and will do more harm than good.  CA 27-29--unable to order through [employer] system  All breast workup can be done in an outpatient basis and once she is more stable. She has this mass over a year now and prognosis will not change at all. Most likely stage IV and with current events I do not think will be able to tolerate any treatment at all. Palliative care team consult will be appropriate  Cont postop care  Agree with pulmonologist consult for further eval of pleural effusion.    I will follow intermittently      Almaz Kelley MD  Hematology/Oncology  [facility]      Addendum: Noticed that pulmonologist saw her. Agree with recommnedations

## 2022-07-26 LAB
CORRECTED TEMPERATURE (PCO2): 33 MMHG (ref 35–45)
CORRECTED TEMPERATURE (PH): 7.45 (ref 7.35–7.45)
CORRECTED TEMPERATURE (PO2): 70 MMHG (ref 80–100)
HCO3 UR-SCNC: 22.9 MMOL/L
HGB BLD-MCNC: 9.2 G/DL (ref 12–16)
PCO2 BLDA: 33 MMHG (ref 35–45)
PH SMN: 7.45 [PH] (ref 7.35–7.45)
PO2 BLDA: 70 MMHG (ref 80–100)
POC BASE DEFICIT: -0.7 MMOL/L (ref -2–2)
POC COHB: 1.2 %
POC IONIZED CALCIUM: 1.2 MMOL/L (ref 1.12–1.23)
POC METHB: 0.8 % (ref 0.4–1.5)
POC O2HB: 94.3 % (ref 94–97)
POC SATURATED O2: 94.6 %
POC TEMPERATURE: 37 °C
POTASSIUM BLD-SCNC: 3.7 MMOL/L (ref 3.5–5)
SODIUM BLD-SCNC: 115 MMOL/L (ref 119–161)
SPECIMEN SOURCE: ABNORMAL

## 2022-07-26 PROCEDURE — 97116 GAIT TRAINING THERAPY: CPT | Mod: CQ

## 2022-07-26 PROCEDURE — 97530 THERAPEUTIC ACTIVITIES: CPT | Mod: CO

## 2022-07-26 PROCEDURE — 63600175 PHARM REV CODE 636 W HCPCS: Performed by: INTERNAL MEDICINE

## 2022-07-26 PROCEDURE — 25000003 PHARM REV CODE 250: Performed by: SPECIALIST

## 2022-07-26 PROCEDURE — 99232 PR SUBSEQUENT HOSPITAL CARE,LEVL II: ICD-10-PCS | Mod: ,,, | Performed by: INTERNAL MEDICINE

## 2022-07-26 PROCEDURE — 36600 WITHDRAWAL OF ARTERIAL BLOOD: CPT

## 2022-07-26 PROCEDURE — 27000221 HC OXYGEN, UP TO 24 HOURS

## 2022-07-26 PROCEDURE — 99232 SBSQ HOSP IP/OBS MODERATE 35: CPT | Mod: ,,, | Performed by: INTERNAL MEDICINE

## 2022-07-26 PROCEDURE — 97535 SELF CARE MNGMENT TRAINING: CPT | Mod: CO

## 2022-07-26 PROCEDURE — 97530 THERAPEUTIC ACTIVITIES: CPT | Mod: CQ

## 2022-07-26 PROCEDURE — 99900035 HC TECH TIME PER 15 MIN (STAT)

## 2022-07-26 PROCEDURE — 82803 BLOOD GASES ANY COMBINATION: CPT

## 2022-07-26 PROCEDURE — 11000001 HC ACUTE MED/SURG PRIVATE ROOM

## 2022-07-26 RX ADMIN — METOPROLOL SUCCINATE 50 MG: 50 TABLET, EXTENDED RELEASE ORAL at 08:07

## 2022-07-26 RX ADMIN — LISINOPRIL 20 MG: 20 TABLET ORAL at 10:07

## 2022-07-26 RX ADMIN — ACETAMINOPHEN 500 MG: 500 TABLET, FILM COATED ORAL at 04:07

## 2022-07-26 RX ADMIN — FUROSEMIDE 20 MG: 10 INJECTION INTRAMUSCULAR; INTRAVENOUS at 12:07

## 2022-07-26 RX ADMIN — LEVOTHYROXINE SODIUM 50 MCG: 50 TABLET ORAL at 05:07

## 2022-07-26 RX ADMIN — HYDRALAZINE HYDROCHLORIDE 25 MG: 25 TABLET ORAL at 08:07

## 2022-07-26 RX ADMIN — ACETAMINOPHEN 500 MG: 500 TABLET, FILM COATED ORAL at 12:07

## 2022-07-26 RX ADMIN — FAMOTIDINE 20 MG: 20 TABLET, FILM COATED ORAL at 08:07

## 2022-07-26 RX ADMIN — ENOXAPARIN SODIUM 30 MG: 30 INJECTION SUBCUTANEOUS at 04:07

## 2022-07-26 RX ADMIN — LISINOPRIL 20 MG: 20 TABLET ORAL at 08:07

## 2022-07-26 RX ADMIN — HYDRALAZINE HYDROCHLORIDE 25 MG: 25 TABLET ORAL at 10:07

## 2022-07-26 RX ADMIN — ACETAMINOPHEN 500 MG: 500 TABLET, FILM COATED ORAL at 08:07

## 2022-07-26 RX ADMIN — POLYETHYLENE GLYCOL 3350 17 G: 17 POWDER, FOR SOLUTION ORAL at 08:07

## 2022-07-26 NOTE — PLAN OF CARE
Problem: Adult Inpatient Plan of Care  Goal: Patient-Specific Goal (Individualized)  Outcome: Ongoing, Progressing  Flowsheets (Taken 7/25/2022 0196)  Patient-Specific Goals (Include Timeframe): sons goal - for her to get some sleep tongihlyndsay     Problem: Infection  Goal: Absence of Infection Signs and Symptoms  Outcome: Ongoing, Progressing     Problem: Fall Injury Risk  Goal: Absence of Fall and Fall-Related Injury  Outcome: Ongoing, Progressing

## 2022-07-26 NOTE — PROGRESS NOTES
[facility]                       [address] [id number]    PATIENT NAME:       MATTHEW JOHNSON  YOB: 1925  CSN:                [id number]   MRN:                [id number]  ADMIT DATE:         07/18/2022 16:47:00  PHYSICIAN:          Yoni Hale MD                            PROGRESS NOTE    DATE:      HISTORY OF PRESENT ILLNESS:  89-year-old  female.  She developed some   shortness of breath and decreased breath sounds to the right.  Also, she had   some inspiratory/expiratory wheezing and hypoxia.  She was found to have a right   effusion also.  She was given some Lasix and nebulizers, as well as oxygen was   increased, which made her better.  Pulmonary consult was requested, and ask for   possible thoracentesis on Wednesday or Thursday.  She did not have any fever or   chills.  No chest pain or palpitations or other problems.    REVIEW OF SYSTEMS:  X12 as above.    PHYSICAL EXAMINATION:  VITAL SIGNS:  Blood pressure is 145/75, pulse 87, temp 97.5.  GENERAL APPEARANCE:  She is alert with low respirations.  HEART:  Regular rhythm and rate.  LUNGS:  She has decreased breath sounds to the right and inspiratory/expiratory   wheezing.  ABDOMEN:  Soft, nontender.  EXTREMITIES:  No clubbing, cyanosis.  Trace edema.  NEUROLOGIC:  Nonfocal.    LABS:  There are no new labs.  Chest x-ray showed a right effusion with   increased size.    IMPRESSION:    1. Acute hypoxic respiratory failure secondary to right effusion and   bronchospasm.  2. Status post fall and left femoral fracture, status post ORIF.  3. Right breast mass.  4. Hypertension.  5. Postoperative anemia.    PLAN:    1. Patient diuresed with Lasix IV.  2. Pulmonary in the case.  She will go for thoracentesis in the next couple of days.  3. Will continue to keep the sats over 90 with oxygen.  4. Will continue other current  medications.        ______________________________  MD KAVITA Arenas/WANG  DD:  07/26/2022  Time:  07:49AM  DT:  07/26/2022  Time:  08:31AM  Job #:  [id number]      PROGRESS NOTE

## 2022-07-26 NOTE — PROGRESS NOTES
[facility]  Pulmonary Critical Care Note    Patient Name: Leidy Hdez  MRN: [id number]  Admission Date: 7/18/2022  Hospital Length of Stay: 8 days  Code Status: Prior  Attending Provider: Yoni Hale MD  Primary Care Provider: Yoni Hale MD     Subjective:     HPI:   This is a 89-year-old  female who presented to the ED on 7/18/2022 after a fall while getting out of the shower. She sustained a nondisplaced fracture of the left intertrochanteric femur. CTA of chest with lobulated mass in the right breast suspicious for malignancy and lobulated right perihilar soft tissue thickening suspicious for malignancy with metastatic mediastinal adenopathy. Mass has been present for over one year.    Hospital Course/Significant events:  ORIF of left hip 7/19/2022  Oncology consulted. Recommend biopsy of right axillary mass. She is not a candidate for chemotherapy. Work up to be done as outpatient.  Pulmonology being consulted for evaluation of right pleural effusion.  Received 20 mg of lasix this am and another 40 this afternoon. Also receiving 125 mg of solumedrol today.    24 Hour Interval History:  Excellent diuresis from lasix yesterday        Past Surgical History:   Procedure Laterality Date    INTRAMEDULLARY RODDING OF FEMUR Left 7/19/2022    Procedure: INSERTION, INTRAMEDULLARY VERNELL, FEMUR;  Surgeon: Antonio Matthew MD;  Location: [facility];  Service: Orthopedics;  Laterality: Left;       Social History     Socioeconomic History    Marital status:            Objective:     Current Outpatient Medications   Medication Instructions    atorvastatin (LIPITOR) 10 mg, Oral, Daily    hydrALAZINE (APRESOLINE) 50 MG tablet TAKE 1/2 (ONE-HALF) TABLET BY MOUTH TWICE DAILY. IF BLOOD PRESSURE AVERAGE IS GREATEER THAN 130/80 AFTER 2 DAYS, INCREASE TO 1 TABLET TWICE DAILY. IF BLOOD PRESSURE AVERAGE IS GREATER THAN 130/80 AFTER 2 DAYS, INCREASE TO 2 TABLETS TWICE DAILY     levothyroxine (SYNTHROID) 50 mcg, Oral, Daily    lisinopriL (PRINIVIL,ZESTRIL) 20 mg, Oral, 2 times daily    metoprolol succinate (TOPROL-XL) 50 mg, Oral, Daily       Current Inpatient Medications   acetaminophen  500 mg Oral 6 times per day    enoxaparin  30 mg Subcutaneous Daily    famotidine  20 mg Oral Daily    furosemide (LASIX) injection  20 mg Intravenous Q12H    hydrALAZINE  25 mg Oral Q12H    levothyroxine  50 mcg Oral Daily    lisinopriL  20 mg Oral BID    metoprolol succinate  50 mg Oral Daily    polyethylene glycol  17 g Oral BID    senna-docusate 8.6-50 mg  2 tablet Oral QHS           Intake/Output Summary (Last 24 hours) at 7/26/2022 0739  Last data filed at 7/26/2022 0300  Gross per 24 hour   Intake --   Output 3200 ml   Net -3200 ml       Vital Signs (Most Recent):  Temp: 98.2 °F (36.8 °C) (07/26/22 0730)  Pulse: 73 (07/26/22 0730)  Resp: 18 (07/26/22 0730)  BP: (!) 151/78 (07/26/22 0730)  SpO2: (!) 92 % (07/26/22 0730)  Body mass index is 19.45 kg/m².  Weight: 49.8 kg (109 lb 12.6 oz) Vital Signs (24h Range):  Temp:  [96.7 °F (35.9 °C)-98.2 °F (36.8 °C)] 98.2 °F (36.8 °C)  Pulse:  [73-90] 73  Resp:  [17-20] 18  SpO2:  [87 %-96 %] 92 %  BP: (121-178)/(70-89) 151/78     Physical Exam  Vitals reviewed.   Constitutional:       Appearance: She is ill-appearing.   HENT:      Head: Normocephalic and atraumatic.      Nose:      Comments: Nasal cannula in place  Eyes:      General: No scleral icterus.     Conjunctiva/sclera: Conjunctivae normal.   Cardiovascular:      Rate and Rhythm: Normal rate and regular rhythm.   Pulmonary:      Breath sounds: No wheezing or rhonchi.      Comments: Decreased BS right chest  Abdominal:      General: Bowel sounds are normal.      Palpations: Abdomen is soft.   Musculoskeletal:      Left lower leg: Edema present.   Skin:     General: Skin is warm and dry.      Coloration: Skin is pale.   Neurological:      General: No focal deficit present.      Motor: Weakness  present.            Lines/Drains/Airways     Peripheral Intravenous Line  Duration                Peripheral IV - Single Lumen 07/23/22 0800 22 G Anterior;Left Upper Arm 2 days                Significant Labs:    Lab Results   Component Value Date    WBC 11.5 07/21/2022    HGB 9.7 (L) 07/21/2022    HCT 30.0 (L) 07/21/2022    MCV 91.2 07/21/2022     07/21/2022         BMP  Lab Results   Component Value Date     (L) 07/21/2022    K 4.8 07/21/2022    CO2 19 (L) 07/21/2022    BUN 23.5 (H) 07/21/2022    CREATININE 0.96 07/21/2022    CALCIUM 8.2 (L) 07/21/2022    EGFRNONAA 57 07/21/2022       ABG  No results for input(s): PH, PO2, PCO2, HCO3, BE in the last 168 hours.        Significant Imaging:  I have reviewed all pertinent imaging within the past 24 hours.        Assessment/Plan:     Assessment  1. 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 Right breast mass with associated adenopathy; likely malignant  3. Femur fracture s/t fall s/p ORIF  4.   Advanced age      Plan  Last xarelto dose was Sunday, so plan thoracentesis tomorrow am.  Discussed with Dr Geoffrey Valero MD  Pulmonary Critical Care Medicine  [facility]

## 2022-07-26 NOTE — PT/OT/SLP PROGRESS
Occupational Therapy  Treatment    Leidy Hdez   MRN: [id number]   Admitting Diagnosis: Nondisplaced intertrochanteric fracture of left femur, init    OT Date of Treatment: 07/26/22   OT Start Time: 0957  OT Stop Time: 1022  OT Total Time (min): 25 min     Billable Minutes:  Self Care/Home Management 10 and Therapeutic Activity 15  Total Minutes: 25     OT/TIFFANY: TIFFANY     TIFFANY Visit Number: 2    General Precautions: Standard, fall (primary language is Faroese but speaks some english)  Orthopedic Precautions: LLE weight bearing as tolerated  Braces:      Spiritual, Cultural Beliefs, Judaism Practices, Values that Affect Care: no    Subjective:  Communicated with RN prior to session.         Objective:  Patient found with: oxygen, PureWick, bed alarm    Functional Mobility:  Bed Mobility:   Supine to sit: Standby Assistance   Sit to supine: Standby Assistance   Rolling: Activity did not occur   Scooting: Activity did not occur    Transfer Training:   Min A EOB>BS recliner and back. Agreeable to sit UIC, however, began vommitting then c/o of dizziness. Returned to bed with HOB elevated. Once returned to bed, dizziness subsided. RN notified and aware.     UE Dressing:  Min A for UBD of hospital gown.       Balance:   Static Sit: FAIR: Maintains without assist, but unable to take any challenges   Dynamic Sit:  FAIR+: Maintains balance through MINIMAL excursions of active trunk motion  Static Stand: POOR+: Needs MINIMAL assist to maintain  Dynamic stand: POOR+: Needs MIN (minimal ) assist during gait    Additional Treatment:      Patient left HOB elevated with all lines intact and call button in reach    ASSESSMENT:  Leidy Hdez is a 89 y.o. female with a medical diagnosis of Nondisplaced intertrochanteric fracture of left femur, init. Session limited by vomiting. RN notified and aware.    Rehab potential is good    Activity tolerance: Good    Discharge recommendations: nursing facility, skilled     Equipment  recommendations:       GOALS:   Multidisciplinary Problems     Occupational Therapy Goals        Problem: Occupational Therapy    Goal Priority Disciplines Outcome Interventions   Occupational Therapy Goal     OT, PT/OT Ongoing, Progressing    Description: Goals to be met by: 7/3  Patient will increase functional independence with ADLs by performing:    LE Dressing with Minimal Assistance.  Grooming while standing at sink with Stand-by Assistance.  Toileting from toilet with Stand-by Assistance for hygiene and clothing management.   Toilet transfer to toilet with Stand-by Assistance.                     Plan:  Patient to be seen 5 x/week, daily to address the above listed problems via self-care/home management, therapeutic activities, therapeutic exercises  Plan of Care expires: 08/03/22  Plan of Care reviewed with: patient         07/26/2022

## 2022-07-26 NOTE — PROGRESS NOTES
[facility]                       [address] [id number]    PATIENT NAME:       MATTHEW JOHNSON  YOB: 1925  CSN:                [id number]   MRN:                [id number]  ADMIT DATE:         07/18/2022 16:47:00  PHYSICIAN:          Yoni Hale MD                            PROGRESS NOTE    DATE:      SUBJECTIVE:  89-year-old [place]  female.  She is doing much   better.  There is no wheezing.  Respirations are improved, but she is still   requiring 4 L of oxygen.  She has not had any fever or chills.  No chest pain or   palpitations or other problems.  Her vital signs have been stable.  No other   new problems at the present time.    OBJECTIVE:  VITAL SIGNS:  Blood pressure 151/78, pulse 73, temp 98.2.  GENERAL APPEARANCE:  She is alert, in no acute distress.  HEART:  Regular rhythm and rate.  LUNGS:  She has decreased breath sounds in the right base.  ABDOMEN:  Soft, nontender.  Bowel sounds present.  EXTREMITIES:  Trace edema.  No clubbing, cyanosis.  NEUROLOGIC:  Nonfocal.    LABS:  There are no new labs.    IMPRESSION:    1. Acute hypoxic respiratory failure secondary to right effusion and   bronchospasm.  2. Status post ORIF after a fall with left femoral fracture.  3. She has a right breast mass suspicious for cancer.  4. She has history of hypertension.  5. Osteoarthritis.  6. Postoperative anemia.  7. Deconditioning and weakness.  8. Some protein-calorie malnutrition.    PLAN:    1. Will continue with therapy.  2. Will continue to wean the oxygen as tolerated.  3. Will continue with Lasix 20 mg IV twice a day.  4. Will continue with breathing treatments as needed.  5. Lovenox for DVT prophylaxis.  6. She probably will have a thoracentesis tomorrow.        ______________________________  Yoni Hale MD    KAVITA/AQS  DD:  07/26/2022  Time:  07:49AM  DT:  07/26/2022  Time:   08:38AM  Job #:  [id number]      PROGRESS NOTE

## 2022-07-26 NOTE — PROGRESS NOTES
"Progress Note  Hematology/Oncology      Consult Requested By: Yoni Hale MD    Reason for Consult: Breast mass    SUBJECTIVE:     History of Present Illness:  Patient is a 89 y.o.  female presents after a fall.  I speak Nigerien therefore I did not need an .  She sustained a nondisplaced fracture to the left femur and is status post ORIF of the left hip intertrochanteric femur fracture.     CTA of the chest with no evidence of pulmonary embolism but showed enlarged paratracheal, prevascular and subcarinal lymphadenopathy.  Subcarinal lymph node measures 3.2 x 2.2 cm.  Right perihilar area of soft tissue thickening measuring 3.3 cm suspicious for malignancy.  Small right pleural effusion.  Lobulated mass in the right breast measuring 3.7 x 2.6 cm.     Patient reports that about a year ago she fell and hit her right breast.  She felt a mass but she reports that has been the same size since then.  She report sister diagnosed with breast cancer in in her 80s.    Patient is seen today in rounds and she is lying in bed.  Son at bedside.    7/22/2022: Patient is seen in rounds this morning. Son at bedside. He told me that after I left she start crying because of the word "cancer". He does not want me to use the word Cancer in front of her. She is doing good today. She feels better. She told me that she exercise  this morning. NO fever, chills, sweats.    7/25/2022: Patient is seen in rounds this morning. She reports that she is not doing so good today. She reports some nausea and abdominal discomfort. She reports chest pain and some shortness of breath. Noticed mild tachypnea but no distress. She was seen by Dr Hale this AM and CXR today Cardiopericardial silhouette is within normal limits.  There is interval size increase of right pleural effusion and further right lung atelectasis.  Left lung is clear.  No apparent pneumothorax. Her blood pressure is little more elevated this morning. "     7/26/2022: Came to see the patient and she is resting peacefully. Her son was not there today. Plan for thoracentesis tomorrow.  No new problems or concerns. Just asking when she can go home.    Continuous Infusions:    Scheduled Meds:   acetaminophen  500 mg Oral 6 times per day    enoxaparin  30 mg Subcutaneous Daily    famotidine  20 mg Oral Daily    furosemide (LASIX) injection  20 mg Intravenous Q12H    hydrALAZINE  25 mg Oral Q12H    levothyroxine  50 mcg Oral Daily    lisinopriL  20 mg Oral BID    metoprolol succinate  50 mg Oral Daily    polyethylene glycol  17 g Oral BID    senna-docusate 8.6-50 mg  2 tablet Oral QHS     PRN Meds:albuterol-ipratropium, aluminum-magnesium hydroxide-simethicone, calcium carbonate, cloNIDine, diphenhydrAMINE, HYDROmorphone, lactulose, melatonin, methocarbamoL, ondansetron, ondansetron, traMADoL    No past medical history on file.  Past Surgical History:   Procedure Laterality Date    INTRAMEDULLARY RODDING OF FEMUR Left 7/19/2022    Procedure: INSERTION, INTRAMEDULLARY VERNELL, FEMUR;  Surgeon: Antonio Matthew MD;  Location: [facility];  Service: Orthopedics;  Laterality: Left;     No family history on file.       Review of patient's allergies indicates:   Allergen Reactions    Atropine     Diazepam     Promethazine       No current facility-administered medications on file prior to encounter.     Current Outpatient Medications on File Prior to Encounter   Medication Sig Dispense Refill    atorvastatin (LIPITOR) 10 MG tablet Take 10 mg by mouth once daily.      hydrALAZINE (APRESOLINE) 50 MG tablet TAKE 1/2 (ONE-HALF) TABLET BY MOUTH TWICE DAILY. IF BLOOD PRESSURE AVERAGE IS GREATEER THAN 130/80 AFTER 2 DAYS, INCREASE TO 1 TABLET TWICE DAILY. IF BLOOD PRESSURE AVERAGE IS GREATER THAN 130/80 AFTER 2 DAYS, INCREASE TO 2 TABLETS TWICE DAILY      levothyroxine (SYNTHROID) 50 MCG tablet Take 50 mcg by mouth once daily.      lisinopriL (PRINIVIL,ZESTRIL) 20 MG tablet  Take 20 mg by mouth 2 (two) times daily.      metoprolol succinate (TOPROL-XL) 50 MG 24 hr tablet Take 50 mg by mouth once daily.         Review of Systems   Constitutional: Negative for activity change, appetite change, chills, fatigue, fever and unexpected weight change.   HENT: Negative for mouth dryness, mouth sores, nosebleeds, sore throat and trouble swallowing.    Eyes: Negative for visual disturbance.   Respiratory: Negative for cough and shortness of breath.    Cardiovascular: Negative for chest pain, palpitations and leg swelling.   Gastrointestinal: Negative for abdominal distention, abdominal pain, blood in stool, change in bowel habit, constipation, diarrhea, nausea, vomiting and change in bowel habit.   Endocrine: Negative.    Genitourinary: Negative for dysuria, frequency, hematuria and urgency.   Musculoskeletal: Negative for arthralgias, back pain, myalgias and neck pain.   Integumentary:  Negative for rash, breast mass, breast discharge and breast tenderness.   Neurological: Negative for dizziness, tremors, syncope, speech difficulty, weakness, light-headedness, numbness, headaches and memory loss.   Hematological: Does not bruise/bleed easily.   Psychiatric/Behavioral: Negative for confusion and suicidal ideas.   Breast: Negative for mass and tenderness        OBJECTIVE:     Vital Signs (Most Recent)  Temp: 96.6 °F (35.9 °C) (07/26/22 1120)  Pulse: 74 (07/26/22 1120)  Resp: 18 (07/26/22 1120)  BP: (!) 149/81 (07/26/22 1120)  SpO2: (!) 91 % (07/26/22 1120)    Pain Assessment: No pain reported at this time    Vital Signs Range (Last 24H):  Temp:  [96.6 °F (35.9 °C)-98.2 °F (36.8 °C)]   Pulse:  [73-90]   Resp:  [17-20]   BP: (121-151)/(76-81)   SpO2:  [87 %-93 %]     Physical Exam:  Physical Exam  Vitals and nursing note reviewed.   Constitutional:       General: She is not in acute distress.     Appearance: She is ill-appearing.      Comments: Thin and frail looking   HENT:      Head: Normocephalic  and atraumatic.      Mouth/Throat:      Mouth: Mucous membranes are moist.   Eyes:      General: No scleral icterus.     Extraocular Movements: Extraocular movements intact.      Conjunctiva/sclera: Conjunctivae normal.      Pupils: Pupils are equal, round, and reactive to light.   Neck:      Vascular: No JVD.   Cardiovascular:      Rate and Rhythm: Normal rate and regular rhythm.      Heart sounds: No murmur heard.  Pulmonary:      Effort: Tachypnea present.      Breath sounds: Decreased breath sounds present.      Comments: Mild tachypnea but NAD.   Chest:      Chest wall: No deformity or tenderness.   Breasts:      Right: No swelling, mass, nipple discharge, skin change, tenderness, axillary adenopathy or supraclavicular adenopathy.      Left: Mass present. No nipple discharge, skin change, tenderness, axillary adenopathy or supraclavicular adenopathy.        Comments: Patient has a 2-3 cm hard fixed mass in the tail of the right breast.  She has a 2nd mass mostly right axillary area that is very large fixed and hard.  Abdominal:      General: Bowel sounds are normal. There is no distension.      Palpations: Abdomen is soft. There is no mass.      Tenderness: There is no abdominal tenderness.   Musculoskeletal:         General: No swelling or deformity.      Cervical back: Neck supple.   Lymphadenopathy:      Cervical: No cervical adenopathy.      Upper Body:      Right upper body: No supraclavicular or axillary adenopathy.      Left upper body: No supraclavicular or axillary adenopathy.      Lower Body: No right inguinal adenopathy. No left inguinal adenopathy.   Skin:     General: Skin is warm.      Coloration: Skin is not jaundiced.      Findings: No lesion or rash.      Nails: There is no clubbing.   Neurological:      Mental Status: She is alert and oriented to person, place, and time.      Cranial Nerves: Cranial nerve deficit present.      Comments: Hearing deficit   Psychiatric:         Attention and  Perception: Attention normal.         Mood and Affect: Mood and affect normal.         Speech: Speech normal.         Behavior: Behavior is cooperative.         Thought Content: Thought content normal.         Cognition and Memory: Cognition normal.         Judgment: Judgment normal.         Laboratory:  CBC with Differential:  No results for input(s): WBC, NEUTROPCT, LYMPH, MONOPCT, EOSPCT, BASOPHIL, RBC, HCT, HGB, MCV, MCH, RDW, PLT, MPV in the last 24 hours.    Invalid input(s): MCMC  CMP:  No results for input(s): GLU, CALCIUM, ALBUMIN, PROT, NA, K, CO2, CL, BUN, CREATININE, ALKPHOS, ALT, AST, BILITOT in the last 24 hours.  BMP:   No results for input(s): GLU, CALCIUM, NA, K, CO2, CL, BUN, CREATININE in the last 24 hours.   Latest Reference Range & Units 07/18/22 17:42   Alkaline Phosphatase 40 - 150 unit/L 76   PROTEIN TOTAL 5.8 - 7.6 gm/dL 6.4   Albumin 3.4 - 4.8 gm/dL 3.2 (L)   Albumin/Globulin Ratio 1.1 - 2.0 ratio 1.0 (L)   BILIRUBIN TOTAL <=1.5 mg/dL 0.9   AST 5 - 34 unit/L 23   ALT 0 - 55 unit/L 23   Globulin, Total 2.4 - 3.5 gm/dL 3.2   (L): Data is abnormally lowTumor Markers: No results for input(s): PSA, CEA, , AFPTM, LD5407,  in the last 24 hours.    Invalid input(s): ALGTM  Immunology: No results for input(s): SPEP, FRANCES, ELIAN, FREELAMBDALI in the last 24 hours.  Coagulation: No results for input(s): PT, INR, APTT in the last 24 hours.  Specimen (24h ago, onward)            None        Microbiology Results (last 7 days)     Procedure Component Value Units Date/Time    Clostridium Diff Toxin, A & B, EIA [[id number]]  (Normal) Collected: 07/21/22 1523    Order Status: Completed Specimen: Stool Updated: 07/21/22 1610     Clostridium Difficile GDH Antigen Negative     Clostridium Difficile Toxin A/B Negative          Diagnostic Results:  Imaging Results          CTA Chest Non-Coronary (PE Study) (Final result)  Result time 07/18/22 20:30:20    Final result by Kodak Young MD (07/18/22 20:30:20)                  Impression:      1. No evidence of pulmonary embolism through the level of the subsegmental pulmonary arteries.  2. Lobulated mass in the right breast suspicious for malignancy.  3. Lobulated right perihilar soft tissue thickening suspicious for malignancy with metastatic mediastinal adenopathy.      Electronically signed by: Kodak Young MD  Date:    07/18/2022  Time:    20:30             Narrative:    EXAMINATION:  CTA CHEST NON CORONARY    CLINICAL HISTORY:  Pulmonary embolism (PE) suspected, unknown D-dimer;    TECHNIQUE:  Axial images of the chest were obtained with IV contrast administration.  Coronal and sagittal reconstructions were provided.  Three dimensional and MIP images were obtained and evaluated.  Total DLP was 183 mGy-cm. Dose lowering technique and automated exposure control were utilized for this exam.    COMPARISON:  Chest radiograph 07/18/2022.    FINDINGS:  There is no filling defect within the pulmonary arteries through the level of the subsegmental pulmonary arteries.  There is no CT evidence of right heart strain.    The airway is widely patent.  There is enlarged paratracheal, prevascular, and subcarinal lymphadenopathy.  A representative subcarinal lymph node measures 3.2 x 2.2 cm.    There is a right perihilar area of soft tissue thickening measuring approximately 3.3 cm suspicious for malignancy.  There is a small right pleural effusion.  There is lower lobe atelectasis.    There is a lobulated mass in the right breast measuring 3.7 x 2.6 cm.  The upper abdomen demonstrates a small hiatal hernia.  There is no lytic or blastic osseous lesion.                               X-Ray Chest AP Portable (Final result)  Result time 07/18/22 18:26:37    Final result by Selwyn Pink MD (07/18/22 18:26:37)                 Impression:      Areas of hazy right lung opacity, greatest inferiorly.  Some of this may relate to overlying soft tissue, but pneumonia and right pleural effusion  also possible.  Follow-up PA and lateral chest radiographs can be considered.      Electronically signed by: Selwyn Pink  Date:    07/18/2022  Time:    18:26             Narrative:    EXAMINATION:  XR CHEST AP PORTABLE    CLINICAL HISTORY:  hypoxemia;    TECHNIQUE:  Frontal view(s) of the chest.    COMPARISON:  Radiography 01/09/2018    FINDINGS:  Hazy opacity in the lower right lung and extending peripherally over the mid and upper right lung.  No consolidation or significant pleural fluid on the left.  No definitive pneumothorax.  Cardiac silhouette within normal limits for technique.                               X-Ray Femur Ap/Lat Left (Final result)  Result time 07/18/22 18:09:34    Final result by Hayley Wilson MD (07/18/22 18:09:34)                 Impression:      Intertrochanteric fracture left hip      Electronically signed by: Hayley Wilson  Date:    07/18/2022  Time:    18:09             Narrative:    EXAMINATION:  XR FEMUR 2 VIEW LEFT    CLINICAL HISTORY:  Injury, unspecified, initial encounter    TECHNIQUE:  AP and lateral views of the left femur were performed.    COMPARISON:  None}    FINDINGS:  There is an inter trochanteric fracture of the left hip.  No significant displacement is seen.  No angulation is seen.  No other fractures are seen.                               CT Pelvis Without Contrast (Final result)  Result time 07/18/22 18:08:48    Final result by Hayley Wilson MD (07/18/22 18:08:48)                 Impression:      Intertrochanteric fracture seen on the left side as outlined above      Electronically signed by: Hayley Wilson  Date:    07/18/2022  Time:    18:08             Narrative:    EXAMINATION:  CT PELVIS WITHOUT CONTRAST    CLINICAL HISTORY:  Pelvic fracture;    TECHNIQUE:  Multiple axial images were obtained of the pelvis from the iliac crest to the pubic symphysis and sagittal and coronal reconstructions were performed.  No contrast was  administered.    COMPARISON:  None    FINDINGS:  There is a fracture along the intertrochanteric region on the left side.  Fracture is not displaced.  There is comminution of the fracture at the level of the greater trochanter.  The ischium appears to be intact on the right and left side.  Iliac bone is intact on the right and left side.  The right proximal femur appears to be intact.  The sacrum is intact.                                    ASSESSMENT/PLAN:     Patient Active Problem List   Diagnosis    Nondisplaced intertrochanteric fracture of left femur, init    Mass of axillary tail of right breast    Adenopathy        Right breast mass       Anemia       Pleural effusion    Plan  Recommend biopsy of the large right axillary mass.   If positive for breast cancer:   -if ER positive may benefit of endocrine therapy    -if ER negative I will not recommend any treatment at all due to her age and fragility.   She will not be able to tolerate chemotherapy and will do more harm than good.  CA 27-29--unable to order through [facility] system  All breast workup can be done in an outpatient basis and once she is more stable. She has this mass over a year now and prognosis will not change at all. Most likely stage IV and with current events I do not think will be able to tolerate any treatment at all. Palliative care team consult will be appropriate    Agree with pulmonologist consult for further eval of pleural effusion.  If malignant pleural effusion (most likely), will no recommend biopsy of breast mass as we will have Dx already and  will recommend hospice and palliative care.  Cont postop care  I will follow intermittently      Almaz Kelley MD  Hematology/Oncology  [facility]      Addendum: Noticed that pulmonologist saw her. Agree with recommnedations

## 2022-07-26 NOTE — PT/OT/SLP PROGRESS
Discharge Report    SLP following up with pt and pts RN regarding diet tolerance. No signs/sx of aspiration observed. SLP to sign off. No skilled SLP intervention is warranted at this time.       Date: 7/26/2022

## 2022-07-26 NOTE — PLAN OF CARE
Updates sent to [facility]. Dr huerta likely will have thoracentesis tomorrow.   Will follow

## 2022-07-26 NOTE — PHYSICIAN QUERY
PT Name: Leidy Hdez  MR #: [id number]    DOCUMENTATION CLARIFICATION     CDS/: Erin Nettles RN, CDS   Contact Information: [email or web address]  This form is a permanent document in the medical record.     Query Date: July 26, 2022    By submitting this query, we are merely seeking further clarification of documentation.. Please utilize your independent clinical judgment when addressing the question(s) below.    The medical record contains the following:   Indicators  Supporting Clinical Findings Location in Medical Record   x Energy Intake PO intake: 50 - 75 %  <25% intake of meals  Nausea/vomiting  Energy Calories Required: not meeting needs  Protein Required: not meeting needs Nutrition 7/20  Nutrition 7/25    Weight Loss     x Fat Loss Body Fat: unable to obtain Nutrition 7/25   x Muscle Loss Muscle Mass Loss:  unable to obtain Nutrition 7/25    Edema/Fluid Accumulation     x Reduced  Strength (by dynamometer) Deconditioning and weakness. Hematology/Oncology 7/26   x Weight, BMI, Usual Body Weight Admit Weight:  Weight: 47.3 kg (104 lb 4.4 oz)  UBW: 49.9kg  Body mass index is 19.49 kg/m².    Weight Method: Bed Scale  Weight: 49.8 kg (109 lb 12.6 oz)  BMI (Calculated): 19.5 Nutrition 7/20        Nutrition 7/25    Delayed Wound Healing     x Registered Dietician Diagnosis  Malnutrition in the context of acute illness or injury Nutrition 7/25   x Acute or Chronic Illness Some protein-calorie malnutrition.  nondisplaced fracture of the left intertrochanteric femur  Postoperative anemia., OA, HTN, Acute hypoxic respiratory failure secondary to right effusion and   Bronchospasm. She has a right breast mass suspicious for cancer.   Internal Medicine 7/26    Social or Environmental Circumstances      Treatment      Other       Academy of Nutrition and Dietetics (Academy) and the American Society for Parenteral and Enteral Nutrition (A.S.P.E.N.) Clinical Characteristics to support Malnutrition    Malnutrition in the Context of Acute Illness or Injury Malnutrition in the Context of Chronic Illness or Injury Malnutrition in the Context of Social or Environmental Circumstances   Malnutrition Level Moderate Severe Moderate Severe   Moderate   Severe   Energy Intake <75%                   >7 days <50%                 >5 days <75%           >1 month <75%                      >1 month   <75% for >3 months   <50% for >1 month   Weight Loss   1-2% in 1 week >2% in 1 week 5% in 1 month >5% in 1 month 5% in 1 month >5% in 1 month    5% in 1 month >5% in 1 month 7.5% in 3 months >7.5% in 3 months 7.5% in 3 months >7.5% in 3 months    7.5% in 3 months >7.5% in 3 months 10% in 6 months >10% in 6 months 10% in 6 months >10% in 6 months        20% in 1 year                    >20% in 1 year                                                                  20% in 1 year                            >20% in 1 year                                                  Subcutaneous Fat Loss Mild  Moderate  Mild  Severe    Mild   Severe   Muscle Loss Mild depletion Moderate depletion  Mild depletion Severe Depletion   Mild   Severe   Edema/Fluid Accumulation Mild Moderate to severe  Mild  Severe   Mild   Severe   Reduced  Strength         (based on standards supplied by  of dynamometer) N/A Measurably reduced N/A Measurably reduced N/A Measurably reduced      Criteria for mild malnutrition is defined as 1 characteristic outlined above within the established moderate or severe parameters.   A minimum of 2 out of the 6 characteristics noted above are recommended for a diagnosis of moderate or severe malnutrition.   Chronic illness/injury is a disease/condition lasting 3 months or longer.    The noted clinical guidelines are only system guidelines and do not replace the providers clinical judgment.    Provider, please specify diagnosis or diagnoses associated with above clinical findings.    [  ] Moderate Malnutrition -  a minimum of 2 of the 6 moderate malnutrition characteristics noted above    [ x ] Severe Malnutrition - a minimum of 2 of the 6 severe malnutrition characteristics noted above    [  ] Other Nutritional Diagnosis (please specify): _______   [  ] Clinically Undetermined     Please document in your progress notes daily for the duration of treatment until resolved and  include in your discharge summary.      References:    RAJAN Tirado, & MACHELLE Coy (2022, April). Assessment and management of anorexia and cachexia in palliative care. Retrieved May 23, 2022, from [email or web address]/contents/assessment-and-management-of-anorexia-[email or web address]-cachexia-in-palliative-care?[email or web address]&source=see_link     SACHA Bledsoe, PhD, RD, Binh CAMPO P., PhD, RN, BOBBY Croft MD, PhD, Harjinder MARTNIEZ A., MS, RD, [facility], ZHEN Live, MS, RD, The Academy Malnutrition Work Group, The A.S.P.E.N. Board of Directors. (2012). Consensus Statement: Academy of Nutrition and Dietetics and American Society for Parenteral and Enteral Nutrition: Characteristics Recommended for the Identification and Documentation of Adult Malnutrition (Undernutrition). Journal of Parenteral and Enteral Nutrition, 36(3), 275-283. doi:10.1177/7914204099809423     Form No. 33274

## 2022-07-27 ENCOUNTER — HOSPITAL ENCOUNTER (OUTPATIENT)
Dept: RADIOLOGY | Facility: HOSPITAL | Age: 87
Discharge: HOME | End: 2022-07-27
Attending: INTERNAL MEDICINE
Payer: MEDICARE

## 2022-07-27 LAB
CLARITY BODY FLUID (OHS): CLEAR
COLOR BODY FLUID (OHS): YELLOW
GLUCOSE FLD-MCNC: 89 MG/DL
GLUCOSE SERPL-MCNC: 90 MG/DL (ref 75–121)
GRAM STN SPEC: NORMAL
LDH FLD-CCNC: 114 U/L
LDH SERPL-CCNC: 172 U/L (ref 125–220)
LYMPHOCYTE MANUAL BF (OHS): 72 %
LYMPHOCYTES NFR FLD MANUAL: 72 %
MONOCYTE MANUAL BF (OHS): 11 %
MONOCYTES NFR FLD MANUAL: 11 %
NEUTROPHILS MAN BF (OHS): 17 %
NEUTROPHILS NFR FLD MANUAL: 17 %
PROT FLD-MCNC: 2.4 GM/DL
PROT SERPL-MCNC: 4.9 GM/DL (ref 5.8–7.6)
WBC # FLD AUTO: 118 /UL

## 2022-07-27 PROCEDURE — 84157 ASSAY OF PROTEIN OTHER: CPT | Performed by: INTERNAL MEDICINE

## 2022-07-27 PROCEDURE — 32555 ASPIRATE PLEURA W/ IMAGING: CPT

## 2022-07-27 PROCEDURE — 25000003 PHARM REV CODE 250: Performed by: SPECIALIST

## 2022-07-27 PROCEDURE — 87205 SMEAR GRAM STAIN: CPT | Performed by: INTERNAL MEDICINE

## 2022-07-27 PROCEDURE — 97110 THERAPEUTIC EXERCISES: CPT | Mod: CQ

## 2022-07-27 PROCEDURE — 97530 THERAPEUTIC ACTIVITIES: CPT | Mod: CQ

## 2022-07-27 PROCEDURE — 82947 ASSAY GLUCOSE BLOOD QUANT: CPT | Performed by: INTERNAL MEDICINE

## 2022-07-27 PROCEDURE — 87070 CULTURE OTHR SPECIMN AEROBIC: CPT | Performed by: INTERNAL MEDICINE

## 2022-07-27 PROCEDURE — 27000221 HC OXYGEN, UP TO 24 HOURS

## 2022-07-27 PROCEDURE — 63600175 PHARM REV CODE 636 W HCPCS: Performed by: SPECIALIST

## 2022-07-27 PROCEDURE — 11000001 HC ACUTE MED/SURG PRIVATE ROOM

## 2022-07-27 PROCEDURE — 84155 ASSAY OF PROTEIN SERUM: CPT | Performed by: INTERNAL MEDICINE

## 2022-07-27 PROCEDURE — 36415 COLL VENOUS BLD VENIPUNCTURE: CPT | Performed by: INTERNAL MEDICINE

## 2022-07-27 PROCEDURE — 83615 LACTATE (LD) (LDH) ENZYME: CPT | Performed by: INTERNAL MEDICINE

## 2022-07-27 PROCEDURE — 94761 N-INVAS EAR/PLS OXIMETRY MLT: CPT

## 2022-07-27 PROCEDURE — 99900035 HC TECH TIME PER 15 MIN (STAT)

## 2022-07-27 PROCEDURE — 63600175 PHARM REV CODE 636 W HCPCS: Performed by: INTERNAL MEDICINE

## 2022-07-27 PROCEDURE — 89051 BODY FLUID CELL COUNT: CPT | Performed by: INTERNAL MEDICINE

## 2022-07-27 RX ORDER — LIDOCAINE HYDROCHLORIDE 10 MG/ML
1 INJECTION INFILTRATION; PERINEURAL
Status: ACTIVE | OUTPATIENT
Start: 2022-07-27 | End: 2022-07-27

## 2022-07-27 RX ORDER — ENOXAPARIN SODIUM 100 MG/ML
30 INJECTION SUBCUTANEOUS EVERY 12 HOURS
Status: DISCONTINUED | OUTPATIENT
Start: 2022-07-27 | End: 2022-08-01

## 2022-07-27 RX ADMIN — ONDANSETRON 4 MG: 2 INJECTION INTRAMUSCULAR; INTRAVENOUS at 10:07

## 2022-07-27 RX ADMIN — POLYETHYLENE GLYCOL 3350 17 G: 17 POWDER, FOR SOLUTION ORAL at 09:07

## 2022-07-27 RX ADMIN — FUROSEMIDE 20 MG: 10 INJECTION INTRAMUSCULAR; INTRAVENOUS at 11:07

## 2022-07-27 RX ADMIN — LISINOPRIL 20 MG: 20 TABLET ORAL at 08:07

## 2022-07-27 RX ADMIN — HYDRALAZINE HYDROCHLORIDE 25 MG: 25 TABLET ORAL at 08:07

## 2022-07-27 RX ADMIN — FAMOTIDINE 20 MG: 20 TABLET, FILM COATED ORAL at 09:07

## 2022-07-27 RX ADMIN — LISINOPRIL 20 MG: 20 TABLET ORAL at 09:07

## 2022-07-27 RX ADMIN — SENNOSIDES AND DOCUSATE SODIUM 2 TABLET: 50; 8.6 TABLET ORAL at 08:07

## 2022-07-27 RX ADMIN — ENOXAPARIN SODIUM 30 MG: 30 INJECTION SUBCUTANEOUS at 08:07

## 2022-07-27 RX ADMIN — ACETAMINOPHEN 500 MG: 500 TABLET, FILM COATED ORAL at 09:07

## 2022-07-27 RX ADMIN — HYDRALAZINE HYDROCHLORIDE 25 MG: 25 TABLET ORAL at 09:07

## 2022-07-27 RX ADMIN — POLYETHYLENE GLYCOL 3350 17 G: 17 POWDER, FOR SOLUTION ORAL at 08:07

## 2022-07-27 RX ADMIN — FUROSEMIDE 20 MG: 10 INJECTION INTRAMUSCULAR; INTRAVENOUS at 12:07

## 2022-07-27 RX ADMIN — LEVOTHYROXINE SODIUM 50 MCG: 50 TABLET ORAL at 09:07

## 2022-07-27 RX ADMIN — METOPROLOL SUCCINATE 50 MG: 50 TABLET, EXTENDED RELEASE ORAL at 09:07

## 2022-07-27 NOTE — PT/OT/SLP PROGRESS
Pt sleeping upon arrival. Able to awaken with cues, but returned to sleep. Attempted to wake pt up again for therapy but remained sound asleep. Will f/u this PM as schedule permits.

## 2022-07-27 NOTE — PROCEDURES
Discussed with the patient's son and the patient details of the procedure.  All risks benefits and alternatives were discussed and all questions were asked prior to consent being obtained.      Patient was placed in left lateral decubitus position the right chest was then visualized with ultrasound.  Pleural fluid was identified in this area.  Area  was marked then prepped and draped in a sterile fashion.  Local anesthesia with 5 cc of xylocaine was obtained.  Thoracentesis catheter was then inserted into the pleural space with excellent return of pleural fluid noted.  800 cc of clear yellow fluid was then obtained per Vacutainer.  Patient tolerated procedure very well.

## 2022-07-27 NOTE — PT/OT/SLP PROGRESS
Physical Therapy         Treatment        Leidy Hdez   MRN: [id number]        PT Start Time: 0950     PT Stop Time: 1021    PT Total Time (min): 31 min       Billable Minutes:  Therapeutic Activity 16 and Therapeutic Exercise 15  Total Minutes: 31    Treatment Type: Treatment  PT/PTA: PTA     PTA Visit Number: 3       General Precautions: Standard,    Orthopedic Precautions: Orthopedic Precautions : LLE weight bearing as tolerated   Braces: Braces: N/A    Spiritual, Cultural Beliefs, Faith Practices, Values that Affect Care: no    Subjective:  Communicated with nurse and son prior to session.  Son present encouraging pt to work with therapy, pt r/f to get OOB at this time due to being too lethargic  Pain/Comfort  Pain Rating 1: 0/10    Objective:  Patient found laying in bed upon arrival, with Patient found with: oxygen, SCD, pressure relief boots-oxymask 7L    Functional Mobility:  Bed Mobility:   Rolling: Maximum Assistance-  Rolled pt L<>R with nurse assistance to get bandage changed, left pt roll on L side with wedge support upon conclusion   Scooting: Total Assistance - to scoot pt up in bed  Balance      Additional Treatment:  Supine therex, with RLE AAROM, LLE PROM, 10x  Ankle pumps, heel slides, SAQ, hip abd/add, SLR    Activity Tolerance:  Patient limited by fatigue    Patient left HOB elevated with all lines intact, call button in reach and son present.    Assessment:  Leidy Hdez is a 89 y.o. female with a medical diagnosis of Nondisplaced intertrochanteric fracture of left femur, init. Pt's son educated SNF vs rehab and pt's willing ness to work with therapy, son stated he will be here tomorrow hoping to encourage pt to ambulate the halls then    Rehab potential is good and fair.    Activity tolerance: Poor    Discharge recommendations: Discharge Facility/Level of Care Needs: nursing facility, skilled     Equipment recommendations:       GOALS:   Multidisciplinary Problems     Physical  Therapy Goals        Problem: Physical Therapy    Goal Priority Disciplines Outcome Goal Variances Interventions   Physical Therapy Goal     PT, PT/OT Ongoing, Progressing     Description: Goals to be met by: 2022     Patient will increase functional independence with mobility by performin. Supine to sit with Stand-by Assistance  2. Sit to supine with Stand-by Assistance  3. Sit to stand transfer with Stand-by Assistance  4. Gait  x 300 feet with Supervision using Rolling Walker.                      PLAN:    Patient to be seen daily  to address the above listed problems via therapeutic activities, therapeutic exercises  Plan of Care expires: 22  Plan of Care reviewed with: patient, son         2022

## 2022-07-28 LAB
ANION GAP SERPL CALC-SCNC: 9 MEQ/L
BUN SERPL-MCNC: 23.6 MG/DL (ref 9.8–20.1)
CALCIUM SERPL-MCNC: 8.9 MG/DL (ref 8.4–10.2)
CHLORIDE SERPL-SCNC: 89 MMOL/L (ref 98–111)
CO2 SERPL-SCNC: 24 MMOL/L (ref 23–31)
CREAT SERPL-MCNC: 0.85 MG/DL (ref 0.55–1.02)
CREAT/UREA NIT SERPL: 28
GLUCOSE SERPL-MCNC: 80 MG/DL (ref 75–121)
POTASSIUM SERPL-SCNC: 3.5 MMOL/L (ref 3.5–5.1)
SODIUM SERPL-SCNC: 122 MMOL/L (ref 132–146)

## 2022-07-28 PROCEDURE — 99231 SBSQ HOSP IP/OBS SF/LOW 25: CPT | Mod: ,,, | Performed by: INTERNAL MEDICINE

## 2022-07-28 PROCEDURE — 94761 N-INVAS EAR/PLS OXIMETRY MLT: CPT

## 2022-07-28 PROCEDURE — 27000221 HC OXYGEN, UP TO 24 HOURS

## 2022-07-28 PROCEDURE — 97530 THERAPEUTIC ACTIVITIES: CPT | Mod: CQ

## 2022-07-28 PROCEDURE — 80048 BASIC METABOLIC PNL TOTAL CA: CPT | Performed by: INTERNAL MEDICINE

## 2022-07-28 PROCEDURE — 99231 PR SUBSEQUENT HOSPITAL CARE,LEVL I: ICD-10-PCS | Mod: ,,, | Performed by: INTERNAL MEDICINE

## 2022-07-28 PROCEDURE — 11000001 HC ACUTE MED/SURG PRIVATE ROOM

## 2022-07-28 PROCEDURE — 36415 COLL VENOUS BLD VENIPUNCTURE: CPT | Performed by: INTERNAL MEDICINE

## 2022-07-28 PROCEDURE — 63600175 PHARM REV CODE 636 W HCPCS: Performed by: INTERNAL MEDICINE

## 2022-07-28 PROCEDURE — 97116 GAIT TRAINING THERAPY: CPT | Mod: CQ

## 2022-07-28 PROCEDURE — 25000003 PHARM REV CODE 250: Performed by: SPECIALIST

## 2022-07-28 PROCEDURE — 94799 UNLISTED PULMONARY SVC/PX: CPT

## 2022-07-28 RX ADMIN — SENNOSIDES AND DOCUSATE SODIUM 2 TABLET: 50; 8.6 TABLET ORAL at 08:07

## 2022-07-28 RX ADMIN — FAMOTIDINE 20 MG: 20 TABLET, FILM COATED ORAL at 09:07

## 2022-07-28 RX ADMIN — LISINOPRIL 20 MG: 20 TABLET ORAL at 09:07

## 2022-07-28 RX ADMIN — POLYETHYLENE GLYCOL 3350 17 G: 17 POWDER, FOR SOLUTION ORAL at 08:07

## 2022-07-28 RX ADMIN — ACETAMINOPHEN 500 MG: 500 TABLET, FILM COATED ORAL at 11:07

## 2022-07-28 RX ADMIN — POLYETHYLENE GLYCOL 3350 17 G: 17 POWDER, FOR SOLUTION ORAL at 09:07

## 2022-07-28 RX ADMIN — HYDRALAZINE HYDROCHLORIDE 25 MG: 25 TABLET ORAL at 08:07

## 2022-07-28 RX ADMIN — ENOXAPARIN SODIUM 30 MG: 30 INJECTION SUBCUTANEOUS at 08:07

## 2022-07-28 RX ADMIN — LISINOPRIL 20 MG: 20 TABLET ORAL at 08:07

## 2022-07-28 RX ADMIN — ACETAMINOPHEN 500 MG: 500 TABLET, FILM COATED ORAL at 09:07

## 2022-07-28 RX ADMIN — HYDRALAZINE HYDROCHLORIDE 25 MG: 25 TABLET ORAL at 09:07

## 2022-07-28 RX ADMIN — LEVOTHYROXINE SODIUM 50 MCG: 50 TABLET ORAL at 04:07

## 2022-07-28 RX ADMIN — ENOXAPARIN SODIUM 30 MG: 30 INJECTION SUBCUTANEOUS at 09:07

## 2022-07-28 RX ADMIN — METOPROLOL SUCCINATE 50 MG: 50 TABLET, EXTENDED RELEASE ORAL at 09:07

## 2022-07-28 RX ADMIN — FUROSEMIDE 20 MG: 10 INJECTION INTRAMUSCULAR; INTRAVENOUS at 11:07

## 2022-07-28 NOTE — PT/OT/SLP PROGRESS
Physical Therapy         Treatment        Leidy Hdez   MRN: [id number]        PT Start Time: 1412     PT Stop Time: 1445    PT Total Time (min): 33 min       Billable Minutes:  Gait [id number] and Therapeutic Activity 18  Total Minutes: 33    Treatment Type: Treatment  PT/PTA: PTA     PTA Visit Number: 4       General Precautions: Standard, fall  Orthopedic Precautions: Orthopedic Precautions : LLE weight bearing as tolerated   Braces: Braces: N/A    Spiritual, Cultural Beliefs, Sikh Practices, Values that Affect Care: no    Subjective:  Communicated with nurse prior to session.  Max motivation for pt to participate in OOB activites  Pain/Comfort  Pain Rating 1: 0/10    Objective:  Patient found laying in bed upon arrival, with Patient found with: oxygen, SCD, pressure relief boots  -oxymask 3L, with constant vc to keep oxymask donned on  Functional Mobility:  Bed Mobility:   Supine to sit: Total Assistance- HOB elevated, noted with pt trying to lay back down   Sit to supine: Maximum Assistance   Rolling: Activity did not occur   Scooting: Maximum Assistance    Balance:   Static Sit: POOR+: Needs MINIMAL assist to maintain  Dynamic Sit:  0: N/A  Static Stand: POOR: Needs MODERATE assist to maintain  Dynamic stand: 0: N/A    Transfer Training:  Bed <> Chair:  Step Transfer with Minimal Assistance and Moderate Assistance with Rolling Walker pt t/f from EOB to bedside chair with a seat rest break noted, pt min A to transitional tx, mod A for gait, pt presented with an atalgic like gait of 10ft x2, with tc given for safety to keep RW in close proximity    Activity Tolerance:  Patient tolerated treatment well and pt limited by poor motivation due to fatigue    Patient left HOB elevated with call button in reach and bed alarm on.    Assessment:  Leidy Hdez is a 89 y.o. female with a medical diagnosis of Nondisplaced intertrochanteric fracture of left femur, init. Pt was able to ambulate today with  cueing to participate with therapy    Rehab potential is good and fair.    Activity tolerance: Fair    Discharge recommendations: Discharge Facility/Level of Care Needs: nursing facility, skilled     Equipment recommendations:       GOALS:   Multidisciplinary Problems     Physical Therapy Goals        Problem: Physical Therapy    Goal Priority Disciplines Outcome Goal Variances Interventions   Physical Therapy Goal     PT, PT/OT Ongoing, Progressing     Description: Goals to be met by: 2022     Patient will increase functional independence with mobility by performin. Supine to sit with Stand-by Assistance  2. Sit to supine with Stand-by Assistance  3. Sit to stand transfer with Stand-by Assistance  4. Gait  x 300 feet with Supervision using Rolling Walker.                      PLAN:    Patient to be seen daily  to address the above listed problems via gait training, therapeutic activities  Plan of Care expires: 22  Plan of Care reviewed with: patient         2022

## 2022-07-28 NOTE — PROGRESS NOTES
[facility]  Pulmonary Critical Care Note    Patient Name: Leidy Hdez  MRN: [id number]  Admission Date: 7/18/2022  Hospital Length of Stay: 10 days  Code Status: Full Code  Attending Provider: Yoni Hale MD  Primary Care Provider: Yoni Hale MD     Subjective:     HPI:   This is a 89-year-old  female who presented to the ED on 7/18/2022 after a fall while getting out of the shower. She sustained a nondisplaced fracture of the left intertrochanteric femur. CTA of chest with lobulated mass in the right breast suspicious for malignancy and lobulated right perihilar soft tissue thickening suspicious for malignancy with metastatic mediastinal adenopathy. Mass has been present for over one year.    Hospital Course/Significant events:  ORIF of left hip 7/19/2022  Oncology consulted. Recommend biopsy of right axillary mass. She is not a candidate for chemotherapy. Work up to be done as outpatient.  Pulmonology being consulted for evaluation of right pleural effusion.  Received 20 mg of lasix this am and another 40 this afternoon. Also receiving 125 mg of solumedrol today.    24 Hour Interval History:  Thoracentesis was performed yesterday.  Exudative based on LDH criteria only.  Pleural fluid LDH is only 114 but is 60% of the serum.  Small number of white blood cells with lymphocytic predominance.  Patient breathing easily on OxyMask.  More awake today but still confused.    No past medical history on file.    Past Surgical History:   Procedure Laterality Date    INTRAMEDULLARY RODDING OF FEMUR Left 7/19/2022    Procedure: INSERTION, INTRAMEDULLARY VERNELL, FEMUR;  Surgeon: Antonio Matthew MD;  [facility]: [facility];  Service: Orthopedics;  Laterality: Left;    THORACENTESIS  7/27/2022            Social History     Socioeconomic History    Marital status:            Objective:     Current Outpatient Medications   Medication Instructions    atorvastatin (LIPITOR) 10 mg,  Oral, Daily    hydrALAZINE (APRESOLINE) 50 MG tablet TAKE 1/2 (ONE-HALF) TABLET BY MOUTH TWICE DAILY. IF BLOOD PRESSURE AVERAGE IS GREATEER THAN 130/80 AFTER 2 DAYS, INCREASE TO 1 TABLET TWICE DAILY. IF BLOOD PRESSURE AVERAGE IS GREATER THAN 130/80 AFTER 2 DAYS, INCREASE TO 2 TABLETS TWICE DAILY    levothyroxine (SYNTHROID) 50 mcg, Oral, Daily    lisinopriL (PRINIVIL,ZESTRIL) 20 mg, Oral, 2 times daily    metoprolol succinate (TOPROL-XL) 50 mg, Oral, Daily       Current Inpatient Medications   acetaminophen  500 mg Oral 6 times per day    enoxaparin  30 mg Subcutaneous Q12H    famotidine  20 mg Oral Daily    furosemide (LASIX) injection  20 mg Intravenous Q12H    hydrALAZINE  25 mg Oral Q12H    levothyroxine  50 mcg Oral Daily    lisinopriL  20 mg Oral BID    metoprolol succinate  50 mg Oral Daily    polyethylene glycol  17 g Oral BID    senna-docusate 8.6-50 mg  2 tablet Oral QHS         No intake or output data in the 24 hours ending 07/28/22 0810        Vital Signs (Most Recent):  Temp: 98 °F (36.7 °C) (07/28/22 0741)  Pulse: 67 (07/28/22 0741)  Resp: 18 (07/28/22 0741)  BP: 128/68 (07/28/22 0741)  SpO2: 97 % (07/28/22 0741)  Body mass index is 19.45 kg/m².  Weight: 49.8 kg (109 lb 12.6 oz) Vital Signs (24h Range):  Temp:  [97.5 °F (36.4 °C)-98.8 °F (37.1 °C)] 98 °F (36.7 °C)  Pulse:  [65-71] 67  Resp:  [17-18] 18  SpO2:  [96 %-98 %] 97 %  BP: (110-128)/(63-72) 128/68     Physical Exam  Constitutional:       Appearance: She is ill-appearing.   HENT:      Head: Normocephalic and atraumatic.      Nose:      Comments: Nasal cannula in place  Eyes:      General: No scleral icterus.     Conjunctiva/sclera: Conjunctivae normal.   Cardiovascular:      Rate and Rhythm: Normal rate and regular rhythm.   Pulmonary:      Breath sounds: No wheezing or rhonchi.      Comments:  Equal bilateral breath sounds  Abdominal:      General: Bowel sounds are normal.      Palpations: Abdomen is soft.   Musculoskeletal:       Left lower leg: Edema present.   Skin:     General: Skin is warm and dry.      Coloration: Skin is pale.   Neurological:      General: No focal deficit present.      Motor: Weakness present.     Mechanical ventilation support:       Lines/Drains/Airways     Peripheral Intravenous Line  Duration                Peripheral IV - Single Lumen 07/23/22 0800 22 G Anterior;Left Upper Arm 5 days                Significant Labs:    Lab Results   Component Value Date    WBC 11.5 07/21/2022    HGB 9.7 (L) 07/21/2022    HCT 30.0 (L) 07/21/2022    MCV 91.2 07/21/2022     07/21/2022         BMP  Lab Results   Component Value Date     (L) 07/21/2022    K 4.8 07/21/2022    CO2 19 (L) 07/21/2022    BUN 23.5 (H) 07/21/2022    CREATININE 0.96 07/21/2022    CALCIUM 8.2 (L) 07/21/2022    EGFRNONAA 57 07/21/2022       ABG  Recent Labs   Lab 07/26/22 1955   PH 7.45   PO2 70*   PCO2 33*   HCO3 22.9           Significant Imaging:  I have reviewed all pertinent imaging within the past 24 hours.  Chest x-ray yesterday post thoracentesis showed complete resolution of the right pleural effusion.      Assessment/Plan:     Assessment  1. 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  Cytology is still pending.  2. Right breast mass with associated adenopathy; likely malignant  3. Femur fracture s/t fall s/p ORIF  4.   Advanced age  5.   Hyponatremia.  ABGs performed yesterday suggest serum sodium was 115 so I will get a stat BMP this morning    Plan  No new recs from a pulmonary standpoint.  Await cytology results.  Okay to transfer to rehab from a pulmonary standpoint.         MICHELET Valero MD  Pulmonary Critical Care Medicine  [facility]

## 2022-07-28 NOTE — PT/OT/SLP PROGRESS
Attempted OT session this AM, pt alert and awake. Provided education and motivation to engage in OT session EOB to increase independence with ADLs. Pt agitated and continued to decline EOB. Son present to assist in encouraging pt to participate but pt continued to decline. MD present towards end of session. SCDs and pressure relief boots reapplied. Educated on importance of donning oxymask. Verbalized understanding. RN notified. Will f/u this PM as schedule permits. ROMAN present [id number]

## 2022-07-28 NOTE — PROGRESS NOTES
"Progress Note  Hematology/Oncology      Consult Requested By: Yoni Hale MD    Reason for Consult: Breast mass    SUBJECTIVE:     History of Present Illness:  Patient is a 89 y.o.  female presents after a fall.  I speak Nepalese therefore I did not need an .  She sustained a nondisplaced fracture to the left femur and is status post ORIF of the left hip intertrochanteric femur fracture.     CTA of the chest with no evidence of pulmonary embolism but showed enlarged paratracheal, prevascular and subcarinal lymphadenopathy.  Subcarinal lymph node measures 3.2 x 2.2 cm.  Right perihilar area of soft tissue thickening measuring 3.3 cm suspicious for malignancy.  Small right pleural effusion.  Lobulated mass in the right breast measuring 3.7 x 2.6 cm.     Patient reports that about a year ago she fell and hit her right breast.  She felt a mass but she reports that has been the same size since then.  She report sister diagnosed with breast cancer in in her 80s.    Patient is seen today in rounds and she is lying in bed.  Son at bedside.    7/22/2022: Patient is seen in rounds this morning. Son at bedside. He told me that after I left she start crying because of the word "cancer". He does not want me to use the word Cancer in front of her. She is doing good today. She feels better. She told me that she exercise  this morning. NO fever, chills, sweats.    7/25/2022: Patient is seen in rounds this morning. She reports that she is not doing so good today. She reports some nausea and abdominal discomfort. She reports chest pain and some shortness of breath. Noticed mild tachypnea but no distress. She was seen by Dr Hale this AM and CXR today Cardiopericardial silhouette is within normal limits.  There is interval size increase of right pleural effusion and further right lung atelectasis.  Left lung is clear.  No apparent pneumothorax. Her blood pressure is little more elevated this morning. "     7/26/2022: Came to see the patient and she is resting peacefully. Her son was not there today. Plan for thoracentesis tomorrow.  No new problems or concerns. Just asking when she can go home.    7/28/2022: Patient is seen in rounds at lunch time,. She is by herself. She is laying in bed with oxymask.  Her breathing is better. She underwent thoracentesis yesterday. 800 cc's of fluid removed. She is more alert today.    Continuous Infusions:    Scheduled Meds:   acetaminophen  500 mg Oral 6 times per day    enoxaparin  30 mg Subcutaneous Q12H    famotidine  20 mg Oral Daily    furosemide (LASIX) injection  20 mg Intravenous Q12H    hydrALAZINE  25 mg Oral Q12H    levothyroxine  50 mcg Oral Daily    lisinopriL  20 mg Oral BID    metoprolol succinate  50 mg Oral Daily    polyethylene glycol  17 g Oral BID    senna-docusate 8.6-50 mg  2 tablet Oral QHS     PRN Meds:albuterol-ipratropium, aluminum-magnesium hydroxide-simethicone, calcium carbonate, cloNIDine, diphenhydrAMINE, HYDROmorphone, lactulose, melatonin, methocarbamoL, ondansetron, ondansetron, traMADoL    No past medical history on file.  Past Surgical History:   Procedure Laterality Date    INTRAMEDULLARY RODDING OF FEMUR Left 7/19/2022    Procedure: INSERTION, INTRAMEDULLARY VERNELL, FEMUR;  Surgeon: Antonio Matthew MD;  Location: [facility];  Service: Orthopedics;  Laterality: Left;    THORACENTESIS  7/27/2022          No family history on file.       Review of patient's allergies indicates:   Allergen Reactions    Atropine     Diazepam     Promethazine       No current facility-administered medications on file prior to encounter.     Current Outpatient Medications on File Prior to Encounter   Medication Sig Dispense Refill    atorvastatin (LIPITOR) 10 MG tablet Take 10 mg by mouth once daily.      hydrALAZINE (APRESOLINE) 50 MG tablet TAKE 1/2 (ONE-HALF) TABLET BY MOUTH TWICE DAILY. IF BLOOD PRESSURE AVERAGE IS GREATEER THAN 130/80 AFTER 2 DAYS,  INCREASE TO 1 TABLET TWICE DAILY. IF BLOOD PRESSURE AVERAGE IS GREATER THAN 130/80 AFTER 2 DAYS, INCREASE TO 2 TABLETS TWICE DAILY      levothyroxine (SYNTHROID) 50 MCG tablet Take 50 mcg by mouth once daily.      lisinopriL (PRINIVIL,ZESTRIL) 20 MG tablet Take 20 mg by mouth 2 (two) times daily.      metoprolol succinate (TOPROL-XL) 50 MG 24 hr tablet Take 50 mg by mouth once daily.         Review of Systems   Constitutional: Negative for activity change, appetite change, chills, fatigue, fever and unexpected weight change.   HENT: Negative for mouth dryness, mouth sores, nosebleeds, sore throat and trouble swallowing.    Eyes: Negative for visual disturbance.   Respiratory: Negative for cough and shortness of breath.    Cardiovascular: Negative for chest pain, palpitations and leg swelling.   Gastrointestinal: Negative for abdominal distention, abdominal pain, blood in stool, change in bowel habit, constipation, diarrhea, nausea, vomiting and change in bowel habit.   Endocrine: Negative.    Genitourinary: Negative for dysuria, frequency, hematuria and urgency.   Musculoskeletal: Negative for arthralgias, back pain, myalgias and neck pain.   Integumentary:  Negative for rash, breast mass, breast discharge and breast tenderness.   Neurological: Negative for dizziness, tremors, syncope, speech difficulty, weakness, light-headedness, numbness, headaches and memory loss.   Hematological: Does not bruise/bleed easily.   Psychiatric/Behavioral: Negative for confusion and suicidal ideas.   Breast: Negative for mass and tenderness        OBJECTIVE:     Vital Signs (Most Recent)  Temp: 97.8 °F (36.6 °C) (07/28/22 1115)  Pulse: 67 (07/28/22 1115)  Resp: 18 (07/28/22 1115)  BP: 110/61 (07/28/22 1115)  SpO2: 95 % (07/28/22 1115)    Pain Assessment: No pain reported at this time    Vital Signs Range (Last 24H):  Temp:  [97.6 °F (36.4 °C)-98.8 °F (37.1 °C)]   Pulse:  [65-71]   Resp:  [17-18]   BP: (110-128)/(61-72)   SpO2:   [95 %-98 %]     Physical Exam:  Physical Exam  Vitals and nursing note reviewed.   Constitutional:       General: She is not in acute distress.     Appearance: She is ill-appearing.      Comments: Thin and frail looking   HENT:      Head: Normocephalic and atraumatic.      Mouth/Throat:      Mouth: Mucous membranes are moist.   Eyes:      General: No scleral icterus.     Extraocular Movements: Extraocular movements intact.      Conjunctiva/sclera: Conjunctivae normal.      Pupils: Pupils are equal, round, and reactive to light.   Neck:      Vascular: No JVD.   Cardiovascular:      Rate and Rhythm: Normal rate and regular rhythm.      Heart sounds: No murmur heard.  Pulmonary:      Effort: Tachypnea present.      Breath sounds: Decreased breath sounds present.      Comments: Mild tachypnea but NAD.   Chest:      Chest wall: No deformity or tenderness.   Breasts:      Right: No swelling, mass, nipple discharge, skin change, tenderness, axillary adenopathy or supraclavicular adenopathy.      Left: Mass present. No nipple discharge, skin change, tenderness, axillary adenopathy or supraclavicular adenopathy.        Comments: Patient has a 2-3 cm hard fixed mass in the tail of the right breast.  She has a 2nd mass mostly right axillary area that is very large fixed and hard.  Abdominal:      General: Bowel sounds are normal. There is no distension.      Palpations: Abdomen is soft. There is no mass.      Tenderness: There is no abdominal tenderness.   Musculoskeletal:         General: No swelling or deformity.      Cervical back: Neck supple.   Lymphadenopathy:      Cervical: No cervical adenopathy.      Upper Body:      Right upper body: No supraclavicular or axillary adenopathy.      Left upper body: No supraclavicular or axillary adenopathy.      Lower Body: No right inguinal adenopathy. No left inguinal adenopathy.   Skin:     General: Skin is warm.      Coloration: Skin is not jaundiced.      Findings: No lesion or  rash.      Nails: There is no clubbing.   Neurological:      Mental Status: She is alert and oriented to person, place, and time.      Cranial Nerves: Cranial nerve deficit present.      Comments: Hearing deficit   Psychiatric:         Attention and Perception: Attention normal.         Mood and Affect: Mood and affect normal.         Speech: Speech normal.         Behavior: Behavior is cooperative.         Thought Content: Thought content normal.         Cognition and Memory: Cognition normal.         Judgment: Judgment normal.         Laboratory:  CBC with Differential:  No results for input(s): WBC, NEUTROPCT, LYMPH, MONOPCT, EOSPCT, BASOPHIL, RBC, HCT, HGB, MCV, MCH, RDW, PLT, MPV in the last 24 hours.    Invalid input(s): MCMC  CMP:  Recent Labs   Lab 07/28/22  0835   CALCIUM 8.9   *   K 3.5   CO2 24   BUN 23.6*   CREATININE 0.85     BMP:   Recent Labs   Lab 07/28/22  0835   CALCIUM 8.9   *   K 3.5   CO2 24   BUN 23.6*   CREATININE 0.85      Latest Reference Range & Units 07/18/22 17:42   Alkaline Phosphatase 40 - 150 unit/L 76   PROTEIN TOTAL 5.8 - 7.6 gm/dL 6.4   Albumin 3.4 - 4.8 gm/dL 3.2 (L)   Albumin/Globulin Ratio 1.1 - 2.0 ratio 1.0 (L)   BILIRUBIN TOTAL <=1.5 mg/dL 0.9   AST 5 - 34 unit/L 23   ALT 0 - 55 unit/L 23   Globulin, Total 2.4 - 3.5 gm/dL 3.2   (L): Data is abnormally lowTumor Markers: No results for input(s): PSA, CEA, , AFPTM, BK3322,  in the last 24 hours.    Invalid input(s): ALGTM  Immunology: No results for input(s): SPEP, FRANCES, ELIAN, FREELAMBDALI in the last 24 hours.  Coagulation: No results for input(s): PT, INR, APTT in the last 24 hours.  Specimen (24h ago, onward)            None        Microbiology Results (last 7 days)     Procedure Component Value Units Date/Time    Body Fluid Culture [[id number]] Collected: 07/27/22 0987    Order Status: Completed Specimen: Body Fluid Updated: 07/28/22 0729     Body Fluid Culture No Growth At 24 Hours    Gram Stain  [[id number]] Collected: 07/27/22 0830    Order Status: Completed Specimen: Body Fluid Updated: 07/27/22 1332     GRAM STAIN No WBCs, No bacteria seen     Clostridium Diff Toxin, A & B, EIA [[id number]]  (Normal) Collected: 07/21/22 1523    Order Status: Completed Specimen: Stool Updated: 07/21/22 1610     Clostridium Difficile GDH Antigen Negative     Clostridium Difficile Toxin A/B Negative          Diagnostic Results:  Imaging Results          CTA Chest Non-Coronary (PE Study) (Final result)  Result time 07/18/22 20:30:20    Final result by Kodak Young MD (07/18/22 20:30:20)                 Impression:      1. No evidence of pulmonary embolism through the level of the subsegmental pulmonary arteries.  2. Lobulated mass in the right breast suspicious for malignancy.  3. Lobulated right perihilar soft tissue thickening suspicious for malignancy with metastatic mediastinal adenopathy.      Electronically signed by: Kodak Young MD  Date:    07/18/2022  Time:    20:30             Narrative:    EXAMINATION:  CTA CHEST NON CORONARY    CLINICAL HISTORY:  Pulmonary embolism (PE) suspected, unknown D-dimer;    TECHNIQUE:  Axial images of the chest were obtained with IV contrast administration.  Coronal and sagittal reconstructions were provided.  Three dimensional and MIP images were obtained and evaluated.  Total DLP was 183 mGy-cm. Dose lowering technique and automated exposure control were utilized for this exam.    COMPARISON:  Chest radiograph 07/18/2022.    FINDINGS:  There is no filling defect within the pulmonary arteries through the level of the subsegmental pulmonary arteries.  There is no CT evidence of right heart strain.    The airway is widely patent.  There is enlarged paratracheal, prevascular, and subcarinal lymphadenopathy.  A representative subcarinal lymph node measures 3.2 x 2.2 cm.    There is a right perihilar area of soft tissue thickening measuring approximately 3.3 cm suspicious for malignancy.   There is a small right pleural effusion.  There is lower lobe atelectasis.    There is a lobulated mass in the right breast measuring 3.7 x 2.6 cm.  The upper abdomen demonstrates a small hiatal hernia.  There is no lytic or blastic osseous lesion.                               X-Ray Chest AP Portable (Final result)  Result time 07/18/22 18:26:37    Final result by Selwyn Pink MD (07/18/22 18:26:37)                 Impression:      Areas of hazy right lung opacity, greatest inferiorly.  Some of this may relate to overlying soft tissue, but pneumonia and right pleural effusion also possible.  Follow-up PA and lateral chest radiographs can be considered.      Electronically signed by: Selwyn Pink  Date:    07/18/2022  Time:    18:26             Narrative:    EXAMINATION:  XR CHEST AP PORTABLE    CLINICAL HISTORY:  hypoxemia;    TECHNIQUE:  Frontal view(s) of the chest.    COMPARISON:  Radiography 01/09/2018    FINDINGS:  Hazy opacity in the lower right lung and extending peripherally over the mid and upper right lung.  No consolidation or significant pleural fluid on the left.  No definitive pneumothorax.  Cardiac silhouette within normal limits for technique.                               X-Ray Femur Ap/Lat Left (Final result)  Result time 07/18/22 18:09:34    Final result by Hayley Wilson MD (07/18/22 18:09:34)                 Impression:      Intertrochanteric fracture left hip      Electronically signed by: Hayley Wilson  Date:    07/18/2022  Time:    18:09             Narrative:    EXAMINATION:  XR FEMUR 2 VIEW LEFT    CLINICAL HISTORY:  Injury, unspecified, initial encounter    TECHNIQUE:  AP and lateral views of the left femur were performed.    COMPARISON:  None}    FINDINGS:  There is an inter trochanteric fracture of the left hip.  No significant displacement is seen.  No angulation is seen.  No other fractures are seen.                               CT Pelvis Without Contrast (Final  result)  Result time 07/18/22 18:08:48    Final result by Hayley Wilson MD (07/18/22 18:08:48)                 Impression:      Intertrochanteric fracture seen on the left side as outlined above      Electronically signed by: Hayley Wilson  Date:    07/18/2022  Time:    18:08             Narrative:    EXAMINATION:  CT PELVIS WITHOUT CONTRAST    CLINICAL HISTORY:  Pelvic fracture;    TECHNIQUE:  Multiple axial images were obtained of the pelvis from the iliac crest to the pubic symphysis and sagittal and coronal reconstructions were performed.  No contrast was administered.    COMPARISON:  None    FINDINGS:  There is a fracture along the intertrochanteric region on the left side.  Fracture is not displaced.  There is comminution of the fracture at the level of the greater trochanter.  The ischium appears to be intact on the right and left side.  Iliac bone is intact on the right and left side.  The right proximal femur appears to be intact.  The sacrum is intact.                                    ASSESSMENT/PLAN:     Patient Active Problem List   Diagnosis    Nondisplaced intertrochanteric fracture of left femur, init    Mass of axillary tail of right breast    Adenopathy        Right breast mass       Anemia       Pleural effusion    Plan  No new recommendations.  Recommend biopsy of the large right axillary mass.   If positive for breast cancer:   -if ER positive may benefit of endocrine therapy    -if ER negative I will not recommend any treatment at all due to her age and fragility.   She will not be able to tolerate chemotherapy and will do more harm than good.  CA 27-29--unable to order through [facility] system  All breast workup can be done in an outpatient basis and once she is more stable. She has this mass over a year now and prognosis will not change at all. Most likely stage IV and with current events I do not think will be able to tolerate any treatment at all. Palliative care team consult  will be appropriate    S/p thoracentesis 7/27/2022--breathing easier  Cytology pending  If malignant pleural effusion (most likely), will no recommend biopsy of breast mass as we will have Dx already and  will recommend hospice and palliative care.    I will follow intermittently      Almaz Kelley MD  Hematology/Oncology  [facility]      Addendum: Noticed that pulmonologist saw her. Agree with recommnedations

## 2022-07-28 NOTE — CONSULTS
The patient has received her desired spiritual cares. I was in her room yestwerday . I prayed for her, anointed her and prayed for her. I was there this morning too to attend to her because her nurse presented her request for more prayers, I prayed and blessed herin the presence of her son.. The patient expressed her rah and gratitude after the prayers. I promised her a follow up visit.  07/28/22

## 2022-07-29 LAB
ESTROGEN SERPL-MCNC: NORMAL PG/ML
INSULIN SERPL-ACNC: NORMAL U[IU]/ML
LAB AP CLINICAL INFORMATION: NORMAL
LAB AP COMMENTS: NORMAL
LAB AP GROSS DESCRIPTION: NORMAL
LAB AP NON-GYN INTERPRETATION SPECIMEN 1: NORMAL
LAB AP NON-GYN SPECIMEN 1 ADEQUACY: NORMAL
T3RU NFR SERPL: NORMAL %

## 2022-07-29 PROCEDURE — 94761 N-INVAS EAR/PLS OXIMETRY MLT: CPT

## 2022-07-29 PROCEDURE — S0179 MEGESTROL 20 MG: HCPCS | Performed by: INTERNAL MEDICINE

## 2022-07-29 PROCEDURE — 63600175 PHARM REV CODE 636 W HCPCS: Performed by: INTERNAL MEDICINE

## 2022-07-29 PROCEDURE — 99231 PR SUBSEQUENT HOSPITAL CARE,LEVL I: ICD-10-PCS | Mod: ,,, | Performed by: INTERNAL MEDICINE

## 2022-07-29 PROCEDURE — 25000003 PHARM REV CODE 250: Performed by: SPECIALIST

## 2022-07-29 PROCEDURE — 25000003 PHARM REV CODE 250: Performed by: INTERNAL MEDICINE

## 2022-07-29 PROCEDURE — 94799 UNLISTED PULMONARY SVC/PX: CPT

## 2022-07-29 PROCEDURE — 99231 SBSQ HOSP IP/OBS SF/LOW 25: CPT | Mod: ,,, | Performed by: INTERNAL MEDICINE

## 2022-07-29 PROCEDURE — 27000221 HC OXYGEN, UP TO 24 HOURS

## 2022-07-29 PROCEDURE — 11000001 HC ACUTE MED/SURG PRIVATE ROOM

## 2022-07-29 RX ORDER — MEGESTROL ACETATE 40 MG/ML
200 SUSPENSION ORAL DAILY
Status: DISCONTINUED | OUTPATIENT
Start: 2022-07-29 | End: 2022-08-02 | Stop reason: HOSPADM

## 2022-07-29 RX ORDER — MIRTAZAPINE 7.5 MG/1
7.5 TABLET, FILM COATED ORAL NIGHTLY
Status: DISCONTINUED | OUTPATIENT
Start: 2022-07-29 | End: 2022-08-02 | Stop reason: HOSPADM

## 2022-07-29 RX ADMIN — POLYETHYLENE GLYCOL 3350 17 G: 17 POWDER, FOR SOLUTION ORAL at 08:07

## 2022-07-29 RX ADMIN — HYDRALAZINE HYDROCHLORIDE 25 MG: 25 TABLET ORAL at 08:07

## 2022-07-29 RX ADMIN — ACETAMINOPHEN 500 MG: 500 TABLET, FILM COATED ORAL at 11:07

## 2022-07-29 RX ADMIN — ACETAMINOPHEN 500 MG: 500 TABLET, FILM COATED ORAL at 05:07

## 2022-07-29 RX ADMIN — METOPROLOL SUCCINATE 50 MG: 50 TABLET, EXTENDED RELEASE ORAL at 08:07

## 2022-07-29 RX ADMIN — FAMOTIDINE 20 MG: 20 TABLET, FILM COATED ORAL at 08:07

## 2022-07-29 RX ADMIN — MIRTAZAPINE 7.5 MG: 7.5 TABLET ORAL at 08:07

## 2022-07-29 RX ADMIN — LISINOPRIL 20 MG: 20 TABLET ORAL at 08:07

## 2022-07-29 RX ADMIN — LEVOTHYROXINE SODIUM 50 MCG: 50 TABLET ORAL at 05:07

## 2022-07-29 RX ADMIN — TRAMADOL HYDROCHLORIDE 50 MG: 50 TABLET, COATED ORAL at 09:07

## 2022-07-29 RX ADMIN — FUROSEMIDE 20 MG: 10 INJECTION INTRAMUSCULAR; INTRAVENOUS at 11:07

## 2022-07-29 RX ADMIN — FUROSEMIDE 20 MG: 10 INJECTION INTRAMUSCULAR; INTRAVENOUS at 12:07

## 2022-07-29 RX ADMIN — ENOXAPARIN SODIUM 30 MG: 30 INJECTION SUBCUTANEOUS at 08:07

## 2022-07-29 RX ADMIN — ACETAMINOPHEN 500 MG: 500 TABLET, FILM COATED ORAL at 08:07

## 2022-07-29 RX ADMIN — ACETAMINOPHEN 500 MG: 500 TABLET, FILM COATED ORAL at 12:07

## 2022-07-29 RX ADMIN — SENNOSIDES AND DOCUSATE SODIUM 2 TABLET: 50; 8.6 TABLET ORAL at 08:07

## 2022-07-29 RX ADMIN — MEGESTROL ACETATE 200 MG: 40 SUSPENSION ORAL at 09:07

## 2022-07-29 RX ADMIN — TRAMADOL HYDROCHLORIDE 50 MG: 50 TABLET, COATED ORAL at 08:07

## 2022-07-29 NOTE — PLAN OF CARE
Nursing updates me that pt has hospice order. I spoke with son Thom who tells me he is wanting to talk with [facility] as this was the hospice recommended by Dr Hale to him. He mentions [facility] also. We discussed the hospices and referral was sent to [facility] and [facility] notified. [facility] would not sen to [facility] so the order and info was faxed to [facility] at [phone].   Son is planning on home with hospice on Monday.

## 2022-07-29 NOTE — PROGRESS NOTES
[facility] - Ortho Neuro  Adult Nutrition  Progress Note    SUMMARY       Recommendations    Recommendation/Intervention:   - continue diet per SLP recs    - will swtich oral supplement to boost plus (vanilla or strawberry flavor is pt preference)    - continue medical management of n/v    - continue appetite stimulant    Goals: - meet >75% est energy needs by follow up    Nutrition Goal Status: new    Assessment and Plan    Nutrition Problem  Inadequate Oral Intake    Related to (etiology):   n/v    Signs and Symptoms (as evidenced by):   <25% intake of meals    Interventions(treatment strategy):  Modified composition of meals / snacks, Commercial beverage and Collaboration with other providers    Nutrition Diagnosis Status:   Continues          Malnutrition Assessment          Malnutrition in the context of acute illness or injury    Degree of Malnutrition:  Unable to Complete  Energy Intake:  </= 50% of estimated energy requirement for >/= 5 days  Interpretation of Weight Loss:  unable to obtain  Body Fat: unable to obtain  Area of Body Fat Loss:  unable to obtain  Muscle Mass Loss:  unable to obtain  Area of Muscle Mass Loss: unable to obtain  Fluid Accumulation:  unable to obtain  Edema:  unable to obtain   Reduced  Strength:  not applicable    A minimum of two characteristics is recommended for diagnosis of either severe or non-severe malnutrition.                                   Reason for Assessment    Reason For Assessment: RD follow-up  Diagnosis: other (see comments) (1. Status post fall with left hip fracture with open reduction and internal   fixation.  2. Right breast mass, possible cancer with metastasis.  3. Hypertension.  4. Dyslipidemia.  5. Osteoarthritis.  6. Hypothyroidism.  7. Mild postop anemia.)  General Information Comments: 7/25: not feeling well today, nauseated and not eating much, usually has a pretty good appetite; son reports she drinks protein shakes at home;  "physical assessment not appropriate at this time   7/29: pt not eating much, receiving oral supplement; agitated; noted family plans on bringing pt home with hospice     Nutrition Risk Screen    Nutrition Risk Screen: no indicators present    Nutrition/Diet History    Spiritual, Cultural Beliefs, Sabianist Practices, Values that Affect Care: no  Factors Affecting Nutritional Intake: nausea/vomiting    Anthropometrics    Temp: 97.9 °F (36.6 °C)  Height Method: Estimated  Height: 5' 2.99" (160 cm)  Height (inches): 62.99 in  Weight Method: Bed Scale  Weight: 49.8 kg (109 lb 12.6 oz)  Weight (lb): 109.79 lb  Ideal Body Weight (IBW), Female: 114.95 lb  % Ideal Body Weight, Female (lb): 95.51 %  BMI (Calculated): 19.5  BMI Grade: other (see comments) (<22 if > 65 years)       Lab/Procedures/Meds    Pertinent Labs Comments: 7/25: no recent labs  7/28: Na 122, Cl 89, GUN 23.6  Pertinent Medications Comments: ondansetron, lactulose, furosemide, famotidine, calcium carbonate, docusate    Physical Findings/Assessment         Estimated/Assessed Needs    Weight Used For Calorie Calculations: 49.8 kg (109 lb 12.6 oz)  Energy Calorie Requirements (kcal): 1245-1494kcal (25-30 kcal/kg)  Energy Need Method: Kcal/kg  Protein Requirements: 70 gm (1.4 gm/kg)  Weight Used For Protein Calculations: 49.8 kg (109 lb 12.6 oz)        RDA Method (mL): 1245         Nutrition Prescription Ordered    Current Diet Order: soft and bite sized    Evaluation of Received Nutrient/Fluid Intake    Energy Calories Required: not meeting needs  Protein Required: not meeting needs  Tolerance: not tolerating  % Intake of Estimated Energy Needs: 0 - 25 %  % Meal Intake: 0 - 25 %    Nutrition Risk    Level of Risk/Frequency of Follow-up: moderate     Monitor and Evaluation    Food and Nutrient Intake: food and beverage intake  Anthropometric Measurements: weight change     Nutrition Follow-Up    RD Follow-up?: Yes    "

## 2022-07-29 NOTE — PROGRESS NOTES
"Progress Note  Hematology/Oncology      Consult Requested By: Yoni Hale MD    Reason for Consult: Breast mass    SUBJECTIVE:     History of Present Illness:  Patient is a 89 y.o.  female presents after a fall.  I speak [place] therefore I did not need an .  She sustained a nondisplaced fracture to the left femur and is status post ORIF of the left hip intertrochanteric femur fracture.     CTA of the chest with no evidence of pulmonary embolism but showed enlarged paratracheal, prevascular and subcarinal lymphadenopathy.  Subcarinal lymph node measures 3.2 x 2.2 cm.  Right perihilar area of soft tissue thickening measuring 3.3 cm suspicious for malignancy.  Small right pleural effusion.  Lobulated mass in the right breast measuring 3.7 x 2.6 cm.     Patient reports that about a year ago she fell and hit her right breast.  She felt a mass but she reports that has been the same size since then.  She report sister diagnosed with breast cancer in in her 80s.    Patient is seen today in rounds and she is lying in bed.  Son at bedside.    7/22/2022: Patient is seen in rounds this morning. Son at bedside. He told me that after I left she start crying because of the word "cancer". He does not want me to use the word Cancer in front of her. She is doing good today. She feels better. She told me that she exercise  this morning. NO fever, chills, sweats.    7/25/2022: Patient is seen in rounds this morning. She reports that she is not doing so good today. She reports some nausea and abdominal discomfort. She reports chest pain and some shortness of breath. Noticed mild tachypnea but no distress. She was seen by Dr Hale this AM and CXR today Cardiopericardial silhouette is within normal limits.  There is interval size increase of right pleural effusion and further right lung atelectasis.  Left lung is clear.  No apparent pneumothorax. Her blood pressure is little more elevated this morning. "     7/26/2022: Came to see the patient and she is resting peacefully. Her son was not there today. Plan for thoracentesis tomorrow.  No new problems or concerns. Just asking when she can go home.    7/28/2022: Patient is seen in rounds at lunch time,. She is by herself. She is laying in bed with oxymask.  Her breathing is better. She underwent thoracentesis yesterday. 800 cc's of fluid removed. She is more alert today.    7/29/2022: Patient is seen today in round this morning. She was by herself. She was sleeping comfortable. No fever, chills. No CP.    Continuous Infusions:    Scheduled Meds:   acetaminophen  500 mg Oral 6 times per day    enoxaparin  30 mg Subcutaneous Q12H    famotidine  20 mg Oral Daily    furosemide (LASIX) injection  20 mg Intravenous Q12H    hydrALAZINE  25 mg Oral Q12H    levothyroxine  50 mcg Oral Daily    lisinopriL  20 mg Oral BID    megestroL  200 mg Oral Daily    metoprolol succinate  50 mg Oral Daily    mirtazapine  7.5 mg Oral QHS    polyethylene glycol  17 g Oral BID    senna-docusate 8.6-50 mg  2 tablet Oral QHS     PRN Meds:albuterol-ipratropium, aluminum-magnesium hydroxide-simethicone, calcium carbonate, cloNIDine, diphenhydrAMINE, HYDROmorphone, lactulose, melatonin, methocarbamoL, ondansetron, ondansetron, traMADoL    No past medical history on file.  Past Surgical History:   Procedure Laterality Date    INTRAMEDULLARY RODDING OF FEMUR Left 7/19/2022    Procedure: INSERTION, INTRAMEDULLARY VERNELL, FEMUR;  Surgeon: Antonio Matthew MD;  Location: [facility];  Service: Orthopedics;  Laterality: Left;    THORACENTESIS  7/27/2022          No family history on file.       Review of patient's allergies indicates:   Allergen Reactions    Atropine     Diazepam     Promethazine       No current facility-administered medications on file prior to encounter.     Current Outpatient Medications on File Prior to Encounter   Medication Sig Dispense Refill    atorvastatin (LIPITOR) 10  MG tablet Take 10 mg by mouth once daily.      hydrALAZINE (APRESOLINE) 50 MG tablet TAKE 1/2 (ONE-HALF) TABLET BY MOUTH TWICE DAILY. IF BLOOD PRESSURE AVERAGE IS GREATEER THAN 130/80 AFTER 2 DAYS, INCREASE TO 1 TABLET TWICE DAILY. IF BLOOD PRESSURE AVERAGE IS GREATER THAN 130/80 AFTER 2 DAYS, INCREASE TO 2 TABLETS TWICE DAILY      levothyroxine (SYNTHROID) 50 MCG tablet Take 50 mcg by mouth once daily.      lisinopriL (PRINIVIL,ZESTRIL) 20 MG tablet Take 20 mg by mouth 2 (two) times daily.      metoprolol succinate (TOPROL-XL) 50 MG 24 hr tablet Take 50 mg by mouth once daily.         Review of Systems   Constitutional: Positive for fatigue. Negative for activity change, appetite change, chills, fever and unexpected weight change.   HENT: Negative for mouth dryness, mouth sores, nosebleeds, sore throat and trouble swallowing.    Eyes: Negative for visual disturbance.   Respiratory: Positive for shortness of breath. Negative for cough.    Cardiovascular: Negative for chest pain, palpitations and leg swelling.   Gastrointestinal: Negative for abdominal distention, abdominal pain, blood in stool, change in bowel habit, constipation, diarrhea, nausea, vomiting and change in bowel habit.   Endocrine: Negative.    Genitourinary: Negative for dysuria, frequency, hematuria and urgency.   Musculoskeletal: Negative for arthralgias, back pain, myalgias and neck pain.   Integumentary:  Negative for rash, breast mass, breast discharge and breast tenderness.   Neurological: Positive for weakness. Negative for dizziness, tremors, syncope, speech difficulty, light-headedness, numbness, headaches and memory loss.   Hematological: Does not bruise/bleed easily.   Psychiatric/Behavioral: Negative for confusion and suicidal ideas.   Breast: Negative for mass and tenderness        OBJECTIVE:     Vital Signs (Most Recent)  Temp: 97.9 °F (36.6 °C) (07/29/22 0742)  Pulse: 78 (07/29/22 0742)  Resp: 18 (07/29/22 0906)  BP: 113/70  (07/29/22 0742)  SpO2: 95 % (07/29/22 0742)    Pain Assessment: No pain reported at this time    Vital Signs Range (Last 24H):  Temp:  [97.6 °F (36.4 °C)-98.2 °F (36.8 °C)]   Pulse:  [67-80]   Resp:  [17-18]   BP: ()/(55-70)   SpO2:  [90 %-98 %]     Physical Exam:  Physical Exam  Vitals and nursing note reviewed.   Constitutional:       General: She is not in acute distress.     Appearance: She is ill-appearing.      Comments: Thin and frail looking   HENT:      Head: Normocephalic and atraumatic.      Mouth/Throat:      Mouth: Mucous membranes are moist.   Eyes:      General: No scleral icterus.     Extraocular Movements: Extraocular movements intact.      Conjunctiva/sclera: Conjunctivae normal.      Pupils: Pupils are equal, round, and reactive to light.   Neck:      Vascular: No JVD.   Cardiovascular:      Rate and Rhythm: Normal rate and regular rhythm.      Heart sounds: No murmur heard.  Pulmonary:      Effort: Pulmonary effort is normal.      Breath sounds: Decreased breath sounds present.      Comments: Mild tachypnea but NAD.   Chest:      Chest wall: No deformity or tenderness.   Breasts:      Right: No swelling, mass, nipple discharge, skin change, tenderness, axillary adenopathy or supraclavicular adenopathy.      Left: Mass present. No nipple discharge, skin change, tenderness, axillary adenopathy or supraclavicular adenopathy.        Comments: Patient has a 2-3 cm hard fixed mass in the tail of the right breast.  She has a 2nd mass mostly right axillary area that is very large fixed and hard.  Abdominal:      General: Bowel sounds are normal. There is no distension.      Palpations: Abdomen is soft. There is no mass.      Tenderness: There is no abdominal tenderness.   Musculoskeletal:         General: No swelling or deformity.      Cervical back: Neck supple.   Lymphadenopathy:      Cervical: No cervical adenopathy.      Upper Body:      Right upper body: No supraclavicular or axillary  adenopathy.      Left upper body: No supraclavicular or axillary adenopathy.      Lower Body: No right inguinal adenopathy. No left inguinal adenopathy.   Skin:     General: Skin is warm.      Coloration: Skin is not jaundiced.      Findings: No lesion or rash.      Nails: There is no clubbing.   Neurological:      Mental Status: She is alert and oriented to person, place, and time.      Cranial Nerves: Cranial nerve deficit present.      Comments: Hearing deficit   Psychiatric:         Attention and Perception: Attention normal.         Mood and Affect: Mood and affect normal.         Speech: Speech normal.         Behavior: Behavior is cooperative.         Thought Content: Thought content normal.         Cognition and Memory: Cognition normal.         Judgment: Judgment normal.         Laboratory:  CBC with Differential:  No results for input(s): WBC, NEUTROPCT, LYMPH, MONOPCT, EOSPCT, BASOPHIL, RBC, HCT, HGB, MCV, MCH, RDW, PLT, MPV in the last 24 hours.    Invalid input(s): MCMC  CMP:  No results for input(s): GLU, CALCIUM, ALBUMIN, PROT, NA, K, CO2, CL, BUN, CREATININE, ALKPHOS, ALT, AST, BILITOT in the last 24 hours.  BMP:   No results for input(s): GLU, CALCIUM, NA, K, CO2, CL, BUN, CREATININE in the last 24 hours.   Latest Reference Range & Units 07/18/22 17:42   Alkaline Phosphatase 40 - 150 unit/L 76   PROTEIN TOTAL 5.8 - 7.6 gm/dL 6.4   Albumin 3.4 - 4.8 gm/dL 3.2 (L)   Albumin/Globulin Ratio 1.1 - 2.0 ratio 1.0 (L)   BILIRUBIN TOTAL <=1.5 mg/dL 0.9   AST 5 - 34 unit/L 23   ALT 0 - 55 unit/L 23   Globulin, Total 2.4 - 3.5 gm/dL 3.2   (L): Data is abnormally lowTumor Markers: No results for input(s): PSA, CEA, , AFPTM, OK0190,  in the last 24 hours.    Invalid input(s): ALGTM  Immunology: No results for input(s): SPEP, FRANCES, ELIAN, FREELAMBDALI in the last 24 hours.  Coagulation: No results for input(s): PT, INR, APTT in the last 24 hours.  Specimen (24h ago, onward)            None         Microbiology Results (last 7 days)     Procedure Component Value Units Date/Time    Body Fluid Culture [[id number]] Collected: 07/27/22 0940    Order Status: Completed Specimen: Body Fluid Updated: 07/29/22 0752     Body Fluid Culture No Growth At 48 Hours    Gram Stain [[id number]] Collected: 07/27/22 0830    Order Status: Completed Specimen: Body Fluid Updated: 07/27/22 1332     GRAM STAIN No WBCs, No bacteria seen           Diagnostic Results:  Imaging Results          CTA Chest Non-Coronary (PE Study) (Final result)  Result time 07/18/22 20:30:20    Final result by Kodak Young MD (07/18/22 20:30:20)                 Impression:      1. No evidence of pulmonary embolism through the level of the subsegmental pulmonary arteries.  2. Lobulated mass in the right breast suspicious for malignancy.  3. Lobulated right perihilar soft tissue thickening suspicious for malignancy with metastatic mediastinal adenopathy.      Electronically signed by: Kodak Young MD  Date:    07/18/2022  Time:    20:30             Narrative:    EXAMINATION:  CTA CHEST NON CORONARY    CLINICAL HISTORY:  Pulmonary embolism (PE) suspected, unknown D-dimer;    TECHNIQUE:  Axial images of the chest were obtained with IV contrast administration.  Coronal and sagittal reconstructions were provided.  Three dimensional and MIP images were obtained and evaluated.  Total DLP was 183 mGy-cm. Dose lowering technique and automated exposure control were utilized for this exam.    COMPARISON:  Chest radiograph 07/18/2022.    FINDINGS:  There is no filling defect within the pulmonary arteries through the level of the subsegmental pulmonary arteries.  There is no CT evidence of right heart strain.    The airway is widely patent.  There is enlarged paratracheal, prevascular, and subcarinal lymphadenopathy.  A representative subcarinal lymph node measures 3.2 x 2.2 cm.    There is a right perihilar area of soft tissue thickening measuring approximately 3.3 cm  suspicious for malignancy.  There is a small right pleural effusion.  There is lower lobe atelectasis.    There is a lobulated mass in the right breast measuring 3.7 x 2.6 cm.  The upper abdomen demonstrates a small hiatal hernia.  There is no lytic or blastic osseous lesion.                               X-Ray Chest AP Portable (Final result)  Result time 07/18/22 18:26:37    Final result by Selwyn Pink MD (07/18/22 18:26:37)                 Impression:      Areas of hazy right lung opacity, greatest inferiorly.  Some of this may relate to overlying soft tissue, but pneumonia and right pleural effusion also possible.  Follow-up PA and lateral chest radiographs can be considered.      Electronically signed by: Selwyn Pink  Date:    07/18/2022  Time:    18:26             Narrative:    EXAMINATION:  XR CHEST AP PORTABLE    CLINICAL HISTORY:  hypoxemia;    TECHNIQUE:  Frontal view(s) of the chest.    COMPARISON:  Radiography 01/09/2018    FINDINGS:  Hazy opacity in the lower right lung and extending peripherally over the mid and upper right lung.  No consolidation or significant pleural fluid on the left.  No definitive pneumothorax.  Cardiac silhouette within normal limits for technique.                               X-Ray Femur Ap/Lat Left (Final result)  Result time 07/18/22 18:09:34    Final result by Hayley Wilson MD (07/18/22 18:09:34)                 Impression:      Intertrochanteric fracture left hip      Electronically signed by: Hayley Wilson  Date:    07/18/2022  Time:    18:09             Narrative:    EXAMINATION:  XR FEMUR 2 VIEW LEFT    CLINICAL HISTORY:  Injury, unspecified, initial encounter    TECHNIQUE:  AP and lateral views of the left femur were performed.    COMPARISON:  None}    FINDINGS:  There is an inter trochanteric fracture of the left hip.  No significant displacement is seen.  No angulation is seen.  No other fractures are seen.                               CT Pelvis  Without Contrast (Final result)  Result time 07/18/22 18:08:48    Final result by Hayley Wilson MD (07/18/22 18:08:48)                 Impression:      Intertrochanteric fracture seen on the left side as outlined above      Electronically signed by: Hayley Wilson  Date:    07/18/2022  Time:    18:08             Narrative:    EXAMINATION:  CT PELVIS WITHOUT CONTRAST    CLINICAL HISTORY:  Pelvic fracture;    TECHNIQUE:  Multiple axial images were obtained of the pelvis from the iliac crest to the pubic symphysis and sagittal and coronal reconstructions were performed.  No contrast was administered.    COMPARISON:  None    FINDINGS:  There is a fracture along the intertrochanteric region on the left side.  Fracture is not displaced.  There is comminution of the fracture at the level of the greater trochanter.  The ischium appears to be intact on the right and left side.  Iliac bone is intact on the right and left side.  The right proximal femur appears to be intact.  The sacrum is intact.                                    ASSESSMENT/PLAN:     Patient Active Problem List   Diagnosis    Nondisplaced intertrochanteric fracture of left femur, init    Mass of axillary tail of right breast    Adenopathy        Right breast mass       Anemia       Pleural effusion    Plan  S/p thoracentesis 7/27/2022--breathing easier  On O2  Cytology pending  If malignant pleural effusion (most likely), will no recommend biopsy of breast mass as we will have Dx already and  will recommend hospice and palliative care.    They have decided to go home with Hospice.   I agree with decision of hospice    Dr Amaya will be available for any questions    Almaz Kelley MD  Hematology/Oncology  [facility]    I called the patient's son, as I will be there 3 times now and he was not present, but did not answer the phone. Left voice message and will call Monday if he is not with her.

## 2022-07-29 NOTE — PT/OT/SLP PROGRESS
Attempted this PM. Pt attempting to eat lunch with straw. Set up pt with fork and spoon. Agitated and declined OT session. Pt on 3rd refusal at this time. Will CC with OT regarding POC and to f/u. DCP: home with hospice.

## 2022-07-29 NOTE — PROGRESS NOTES
[facility]  Pulmonary Critical Care Note    Patient Name: Leidy Hdez  MRN: [id number]  Admission Date: 7/18/2022  Hospital Length of Stay: 11 days  Code Status: Full Code  Attending Provider: Yoni Hale MD  Primary Care Provider: Yoni Hale MD     Subjective:     HPI:   This is a 89-year-old  female who presented to the ED on 7/18/2022 after a fall while getting out of the shower. She sustained a nondisplaced fracture of the left intertrochanteric femur. CTA of chest with lobulated mass in the right breast suspicious for malignancy and lobulated right perihilar soft tissue thickening suspicious for malignancy with metastatic mediastinal adenopathy. Mass has been present for over one year.    Hospital Course/Significant events:  ORIF of left hip 7/19/2022  Oncology consulted. Recommend biopsy of right axillary mass. She is not a candidate for chemotherapy. Work up to be done as outpatient.  Pulmonology being consulted for evaluation of right pleural effusion.  Received 20 mg of lasix this am and another 40 this afternoon. Also receiving 125 mg of solumedrol today.  She has deformed on 07/27 8 cc from the right pleura space    24 Hour Interval History:  Awake and alert.  On 3 L OxyMask.  Son and caregiver is planning on home with hospice.    No past medical history on file.    Past Surgical History:   Procedure Laterality Date    INTRAMEDULLARY RODDING OF FEMUR Left 7/19/2022    Procedure: INSERTION, INTRAMEDULLARY VERNELL, FEMUR;  Surgeon: Antonio Matthew MD;  [facility]: [facility];  Service: Orthopedics;  Laterality: Left;    THORACENTESIS  7/27/2022            Social History     Socioeconomic History    Marital status:            Objective:     Current Outpatient Medications   Medication Instructions    atorvastatin (LIPITOR) 10 mg, Oral, Daily    hydrALAZINE (APRESOLINE) 50 MG tablet TAKE 1/2 (ONE-HALF) TABLET BY MOUTH TWICE DAILY. IF BLOOD PRESSURE AVERAGE  IS GREATEER THAN 130/80 AFTER 2 DAYS, INCREASE TO 1 TABLET TWICE DAILY. IF BLOOD PRESSURE AVERAGE IS GREATER THAN 130/80 AFTER 2 DAYS, INCREASE TO 2 TABLETS TWICE DAILY    levothyroxine (SYNTHROID) 50 mcg, Oral, Daily    lisinopriL (PRINIVIL,ZESTRIL) 20 mg, Oral, 2 times daily    metoprolol succinate (TOPROL-XL) 50 mg, Oral, Daily       Current Inpatient Medications   acetaminophen  500 mg Oral 6 times per day    enoxaparin  30 mg Subcutaneous Q12H    famotidine  20 mg Oral Daily    furosemide (LASIX) injection  20 mg Intravenous Q12H    hydrALAZINE  25 mg Oral Q12H    levothyroxine  50 mcg Oral Daily    lisinopriL  20 mg Oral BID    megestroL  200 mg Oral Daily    metoprolol succinate  50 mg Oral Daily    mirtazapine  7.5 mg Oral QHS    polyethylene glycol  17 g Oral BID    senna-docusate 8.6-50 mg  2 tablet Oral QHS           Intake/Output Summary (Last 24 hours) at 7/29/2022 0850  Last data filed at 7/28/2022 0900  Gross per 24 hour   Intake --   Output 1000 ml   Net -1000 ml           Vital Signs (Most Recent):  Temp: 97.9 °F (36.6 °C) (07/29/22 0742)  Pulse: 78 (07/29/22 0742)  Resp: 18 (07/29/22 0742)  BP: 113/70 (07/29/22 0742)  SpO2: 95 % (07/29/22 0742)  Body mass index is 19.45 kg/m².  Weight: 49.8 kg (109 lb 12.6 oz) Vital Signs (24h Range):  Temp:  [97.6 °F (36.4 °C)-98.2 °F (36.8 °C)] 97.9 °F (36.6 °C)  Pulse:  [67-80] 78  Resp:  [17-18] 18  SpO2:  [90 %-98 %] 95 %  BP: ()/(55-70) 113/70     Physical Exam  Constitutional:       Appearance: She is ill-appearing.   HENT:      Head: Normocephalic and atraumatic.      Nose:      Comments: Nasal cannula in place  Eyes:      General: No scleral icterus.     Conjunctiva/sclera: Conjunctivae normal.   Cardiovascular:      Rate and Rhythm: Normal rate and regular rhythm.   Pulmonary:      Breath sounds: No wheezing or rhonchi.      Comments:  Equal bilateral breath sounds  Abdominal:      General: Bowel sounds are normal.      Palpations:  Abdomen is soft.   Musculoskeletal:      Left lower leg: Edema present.   Skin:     General: Skin is warm and dry.      Coloration: Skin is pale.   Neurological:      General: No focal deficit present.      Motor: Weakness present.     Mechanical ventilation support:       Lines/Drains/Airways     Peripheral Intravenous Line  Duration                Peripheral IV - Single Lumen 07/23/22 0800 22 G Anterior;Left Upper Arm 6 days                Significant Labs:    Lab Results   Component Value Date    WBC 11.5 07/21/2022    HGB 9.7 (L) 07/21/2022    HCT 30.0 (L) 07/21/2022    MCV 91.2 07/21/2022     07/21/2022         BMP  Lab Results   Component Value Date     (L) 07/28/2022    K 3.5 07/28/2022    CO2 24 07/28/2022    BUN 23.6 (H) 07/28/2022    CREATININE 0.85 07/28/2022    CALCIUM 8.9 07/28/2022    EGFRNONAA >60 07/28/2022       ABG  Recent Labs   Lab 07/26/22 1955   PH 7.45   PO2 70*   PCO2 33*   HCO3 22.9           Significant Imaging:  I have reviewed all pertinent imaging within the past 24 hours.  Chest x-ray yesterday post thoracentesis showed complete resolution of the right pleural effusion.      Assessment/Plan:     Assessment  1. 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  Cytology is still pending.  2. Right breast mass with associated adenopathy; likely malignant  3. Femur fracture s/t fall s/p ORIF  4.   Advanced age  5.   Hyponatremia.      Plan  No new recs from a pulmonary standpoint.  Await cytology results.  Okay to transfer to home or rehab from a pulmonary standpoint.  Will need supplemental oxygen Pulmonary will sign off.         Damion Giang MD  Pulmonary Critical Care Medicine  [facility]

## 2022-07-29 NOTE — PROGRESS NOTES
[facility]                       [address] [id number]    PATIENT NAME:       MATTHEW JOHNSON  YOB: 1925  CSN:                [id number]   MRN:                [id number]  ADMIT DATE:         07/18/2022 16:47:00  PHYSICIAN:          Yoni Hale MD                            PROGRESS NOTE    DATE:      Date:  07/28/2022    This is a 89-year-old  female.  She is doing better since the   thoracentesis.  She was found to have on the fluid malignant cells.  Her   breathing improved significantly.  She denied any fever or chills.  No chest   pain or palpitations.  Her appetite is somewhat decreased.  No other new   problems.  She is here with some soreness in the surgical area.    REVIEW OF SYSTEMS:  As above.    PHYSICAL EXAMINATION:  VITAL SIGNS:  Blood pressure 122/77, pulse 68, temp 97.7.  GENERAL APPEARANCE:  She is alert, in no acute distress.    HEART:  Regular rhythm and rate.  LUNGS:  Clear, except for some decreased breath sounds on the right side.    ABDOMEN:  Soft, nontender.  Bowel sounds positive, normal.    EXTREMITIES:  No clubbing, cyanosis, or edema.  NEUROLOGIC:  Nonfocal.    LABORATORY STUDIES:  Sodium 122, potassium 3.5, chloride 89, BUN 23.6,   creatinine 0.85.    IMPRESSION:  Status post fall with left femoral fracture status post open   reduction and internal fixation, right breast mass, adenopathy and malignant   pleural effusion, anemia, decondition, clinical malnutrition, history of   hypertension.    PLAN:  Will continue with comfort care.  Son was notified of the findings.    Hem-Onc recommend comfort care and hospice.     Date:  07/29/2022  This is a 89-year-old  female from [place].  She is doing fair.    Her O2 sat is 3 L.  She is alert and awake.  She had been resting last night.    No chest pain or palpitations.  No headaches or other  problem.    REVIEW OF SYSTEMS:  As above.    PHYSICAL EXAMINATION:  VITAL SIGNS:  Blood pressure was 113/70, pulse 78, temp 97.9.  GENERAL APPEARANCE:  She is alert, in no acute distress.    HEART:  Regular rhythm and rate.  LUNGS:  Clear, except for decreased breath sounds at the base.    ABDOMEN:  Soft, nontender.    EXTREMITIES:  No clubbing, cyanosis, or edema.  NEUROLOGIC:  Nonfocal.    LABORATORY STUDIES:  No new labs.    IMPRESSION:  Status post fall with left femoral fracture.  Acute hypoxic   respiratory failure secondary to right effusion and bronchospasm, effusions   malignant, history of breast mass slightly malignant, history of hypertension,   osteoarthritis, postop anemia, deconditioning, weakness, protein malnutrition.    PLAN:  We will go ahead and continue with oxygen, will get sats overnight.  We   will continue with IV Lasix to keep her in the dry side.  We will continue with   Lovenox for DVT prophylaxis.  Will start some Remeron 15 mg half a tab every   day.  We will go ahead and start with some Megace for appetite.        ______________________________  MD KAVITA Arenas/TONIS  DD:  07/29/2022  Time:  08:44AM  DT:  07/29/2022  Time:  09:08AM  Job #:  [id number]      PROGRESS NOTE

## 2022-07-30 PROCEDURE — 25000003 PHARM REV CODE 250: Performed by: SPECIALIST

## 2022-07-30 PROCEDURE — 25000003 PHARM REV CODE 250: Performed by: INTERNAL MEDICINE

## 2022-07-30 PROCEDURE — 63600175 PHARM REV CODE 636 W HCPCS: Performed by: INTERNAL MEDICINE

## 2022-07-30 PROCEDURE — 11000001 HC ACUTE MED/SURG PRIVATE ROOM

## 2022-07-30 PROCEDURE — S0179 MEGESTROL 20 MG: HCPCS | Performed by: INTERNAL MEDICINE

## 2022-07-30 RX ADMIN — ACETAMINOPHEN 500 MG: 500 TABLET, FILM COATED ORAL at 04:07

## 2022-07-30 RX ADMIN — METOPROLOL SUCCINATE 50 MG: 50 TABLET, EXTENDED RELEASE ORAL at 10:07

## 2022-07-30 RX ADMIN — HYDRALAZINE HYDROCHLORIDE 25 MG: 25 TABLET ORAL at 10:07

## 2022-07-30 RX ADMIN — MEGESTROL ACETATE 200 MG: 40 SUSPENSION ORAL at 10:07

## 2022-07-30 RX ADMIN — ENOXAPARIN SODIUM 30 MG: 30 INJECTION SUBCUTANEOUS at 09:07

## 2022-07-30 RX ADMIN — MIRTAZAPINE 7.5 MG: 7.5 TABLET ORAL at 09:07

## 2022-07-30 RX ADMIN — ACETAMINOPHEN 500 MG: 500 TABLET, FILM COATED ORAL at 09:07

## 2022-07-30 RX ADMIN — FUROSEMIDE 20 MG: 10 INJECTION INTRAMUSCULAR; INTRAVENOUS at 12:07

## 2022-07-30 RX ADMIN — LEVOTHYROXINE SODIUM 50 MCG: 50 TABLET ORAL at 04:07

## 2022-07-30 RX ADMIN — POLYETHYLENE GLYCOL 3350 17 G: 17 POWDER, FOR SOLUTION ORAL at 09:07

## 2022-07-30 RX ADMIN — LISINOPRIL 20 MG: 20 TABLET ORAL at 10:07

## 2022-07-30 RX ADMIN — TRAMADOL HYDROCHLORIDE 50 MG: 50 TABLET, COATED ORAL at 10:07

## 2022-07-30 RX ADMIN — ENOXAPARIN SODIUM 30 MG: 30 INJECTION SUBCUTANEOUS at 10:07

## 2022-07-30 RX ADMIN — HYDRALAZINE HYDROCHLORIDE 25 MG: 25 TABLET ORAL at 09:07

## 2022-07-30 RX ADMIN — SENNOSIDES AND DOCUSATE SODIUM 2 TABLET: 50; 8.6 TABLET ORAL at 09:07

## 2022-07-30 RX ADMIN — LISINOPRIL 20 MG: 20 TABLET ORAL at 09:07

## 2022-07-30 RX ADMIN — ACETAMINOPHEN 500 MG: 500 TABLET, FILM COATED ORAL at 10:07

## 2022-07-30 NOTE — PLAN OF CARE
Problem: Adult Inpatient Plan of Care  Goal: Patient-Specific Goal (Individualized)  Outcome: Ongoing, Progressing  Flowsheets (Taken 7/29/2022 2201)  Patient-Specific Goals (Include Timeframe): to rest     Problem: Infection  Goal: Absence of Infection Signs and Symptoms  Outcome: Ongoing, Progressing     Problem: Fall Injury Risk  Goal: Absence of Fall and Fall-Related Injury  Outcome: Ongoing, Progressing

## 2022-07-30 NOTE — PROGRESS NOTES
[facility]                       [address] [id number]    PATIENT NAME:       MATTHEW JOHNSON  YOB: 1925  CSN:                [id number]   MRN:                [id number]  ADMIT DATE:         07/18/2022 16:47:00  PHYSICIAN:          Yoni Hale MD                            PROGRESS NOTE    DATE:      A 89-year-old  American female.  She is a little bit more confused than   usual.  She is on some of her medications.  She is still requiring oxygen with   her sats in the low 90s.  Denies any chest pain.  Her appetite is still somewhat   poor.  No fever, chills, or other problems.    REVIEW OF SYSTEMS:  x12 as above.    PHYSICAL EXAMINATION:  VITAL SIGNS:  Blood pressure is 115/68, pulse is 63, temp 97.1, and O2 sat is   90% on oxygen 3 L.  GENERAL APPEARANCE:  She is alert, a little bit confused.    HEART:  Regular rhythm and rate.   LUNGS:  She has decreased breath sounds to the right lower lung base.    ABDOMEN:  Soft, nontender.  Bowel sounds positive, normal.    EXTREMITIES:  No clubbing, cyanosis, or edema.  NEUROLOGIC:  Nonfocal.  She does have some confusion.    LABS:  There are no new labs.    IMPRESSION:    1. Status post fall, left femoral fracture, status post open reduction, internal   fixation.  2. Right breast mass.  3. Right malignant pleural effusion with adenopathy.    4. Anemia.  5. Deconditioning.  6. Protein-calorie malnutrition.  7. Hypertension.  8. Osteoarthritis.    PLAN:  We will continue with comfort care at the present time.  Case was   discussed with Oncology.  Continue with DVT prophylaxis.  We will continue   Remeron and Megace to increase her appetite.  Arrangements have been made for   her to go home with hospice.        ______________________________  Yoni Hale MD    KAVITA/AQS  DD:  07/30/2022  Time:  03:11PM  DT:  07/30/2022  Time:  04:52PM  Job #:   [id number]      PROGRESS NOTE       Estimated Blood Loss (Cc): minimal

## 2022-07-31 PROCEDURE — 63600175 PHARM REV CODE 636 W HCPCS: Performed by: INTERNAL MEDICINE

## 2022-07-31 PROCEDURE — 11000001 HC ACUTE MED/SURG PRIVATE ROOM

## 2022-07-31 PROCEDURE — 27000221 HC OXYGEN, UP TO 24 HOURS

## 2022-07-31 PROCEDURE — 94761 N-INVAS EAR/PLS OXIMETRY MLT: CPT

## 2022-07-31 PROCEDURE — 25000003 PHARM REV CODE 250: Performed by: INTERNAL MEDICINE

## 2022-07-31 PROCEDURE — 25000003 PHARM REV CODE 250: Performed by: SPECIALIST

## 2022-07-31 RX ORDER — ACETAMINOPHEN 500 MG
500 TABLET ORAL
Status: DISCONTINUED | OUTPATIENT
Start: 2022-07-31 | End: 2022-08-02 | Stop reason: HOSPADM

## 2022-07-31 RX ORDER — TRAMADOL HYDROCHLORIDE 50 MG/1
50 TABLET ORAL EVERY 4 HOURS PRN
Status: DISCONTINUED | OUTPATIENT
Start: 2022-07-31 | End: 2022-08-02 | Stop reason: HOSPADM

## 2022-07-31 RX ORDER — MORPHINE SULFATE ORAL SOLUTION 10 MG/5ML
20 SOLUTION ORAL EVERY 4 HOURS PRN
Status: DISCONTINUED | OUTPATIENT
Start: 2022-07-31 | End: 2022-08-02 | Stop reason: HOSPADM

## 2022-07-31 RX ORDER — DEXTROSE MONOHYDRATE 50 MG/ML
INJECTION, SOLUTION INTRAVENOUS CONTINUOUS
Status: DISCONTINUED | OUTPATIENT
Start: 2022-07-31 | End: 2022-08-02 | Stop reason: HOSPADM

## 2022-07-31 RX ADMIN — LEVOTHYROXINE SODIUM 50 MCG: 50 TABLET ORAL at 05:07

## 2022-07-31 RX ADMIN — POLYETHYLENE GLYCOL 3350 17 G: 17 POWDER, FOR SOLUTION ORAL at 08:07

## 2022-07-31 RX ADMIN — FUROSEMIDE 20 MG: 10 INJECTION INTRAMUSCULAR; INTRAVENOUS at 12:07

## 2022-07-31 RX ADMIN — ENOXAPARIN SODIUM 30 MG: 30 INJECTION SUBCUTANEOUS at 08:07

## 2022-07-31 RX ADMIN — ENOXAPARIN SODIUM 30 MG: 30 INJECTION SUBCUTANEOUS at 09:07

## 2022-07-31 RX ADMIN — SENNOSIDES AND DOCUSATE SODIUM 2 TABLET: 50; 8.6 TABLET ORAL at 08:07

## 2022-07-31 RX ADMIN — LISINOPRIL 20 MG: 20 TABLET ORAL at 08:07

## 2022-07-31 RX ADMIN — FUROSEMIDE 20 MG: 10 INJECTION INTRAMUSCULAR; INTRAVENOUS at 11:07

## 2022-07-31 RX ADMIN — ACETAMINOPHEN 500 MG: 500 TABLET, FILM COATED ORAL at 11:07

## 2022-07-31 RX ADMIN — HYDRALAZINE HYDROCHLORIDE 25 MG: 25 TABLET ORAL at 08:07

## 2022-07-31 RX ADMIN — ACETAMINOPHEN 500 MG: 500 TABLET, FILM COATED ORAL at 12:07

## 2022-07-31 RX ADMIN — MIRTAZAPINE 7.5 MG: 7.5 TABLET ORAL at 08:07

## 2022-07-31 RX ADMIN — ACETAMINOPHEN 500 MG: 500 TABLET, FILM COATED ORAL at 08:07

## 2022-07-31 NOTE — PLAN OF CARE
Problem: Adult Inpatient Plan of Care  Goal: Patient-Specific Goal (Individualized)  Outcome: Ongoing, Progressing     Problem: Infection  Goal: Absence of Infection Signs and Symptoms  Outcome: Ongoing, Progressing     Problem: Fall Injury Risk  Goal: Absence of Fall and Fall-Related Injury  Outcome: Ongoing, Progressing

## 2022-07-31 NOTE — PROGRESS NOTES
[facility]                       [address] [address]    PATIENT NAME:       MATTHEW JOHNSON  YOB: 1925  CSN:                [id number]   MRN:                [id number]  ADMIT DATE:         07/18/2022 16:47:00  PHYSICIAN:          Yoin Hale MD                            PROGRESS NOTE    DATE:      SUBJECTIVE:  A 89-year-old  [place] female.  She has a   significant right effusion with hypoxia that is malignant.  She does have a   breast mass, most likely breast cancer.  She had been sleepy most of the time.    She seems to be in no acute distress.  She had been afebrile.  She is on 3 L by   OxyMask with the sat in the mid 90s and low 90s.  No other significant issues,   though her appetite has been decreased.    REVIEW OF SYSTEMS:  X12 as above.    OBJECTIVE:  VITAL SIGNS:  Blood pressure 113/66, pulse 76, and temp 97.3.  GENERAL APPEARANCE:  She is sleepy, arousable.  HEART:  Regular rhythm and rate.  LUNGS:  She does have a right pleural effusion.  EXTREMITIES:  No clubbing, cyanosis, or edema.  NEUROLOGICAL:  Nonfocal.  She does have some confusion.    LABS:  There are no new labs.    IMPRESSION:    1. Status post fall, left femoral fracture, status post ORIF.  2. Right breast mass with right malignant pleural effusion.  3. Anemia.  4. Deconditioning.  5. Protein-calorie malnutrition.  6. Hypertension.  7. Osteoarthritis.  8. Acute hypoxic respiratory failure.  9. Some confusion.    PLAN:    1. As stated previously, patient will continue with comfort care.  2. Arrangements have been made for hospice at home, and all the DME is still not   there.  Hopefully on Monday, that will be solved.        ______________________________  Yoni Hale MD    KAVITA/AQS  DD:  07/31/2022  Time:  02:03PM  DT:  07/31/2022  Time:  02:33PM  Job #:  [id number]      PROGRESS NOTE

## 2022-08-01 PROBLEM — J91.0 MALIGNANT PLEURAL EFFUSION: Status: ACTIVE | Noted: 2022-08-01

## 2022-08-01 PROBLEM — C50.919 STAGE IV BREAST CANCER IN FEMALE: Status: ACTIVE | Noted: 2022-08-01

## 2022-08-01 LAB — BACTERIA FLD CULT: NORMAL

## 2022-08-01 PROCEDURE — 97110 THERAPEUTIC EXERCISES: CPT | Mod: CQ

## 2022-08-01 PROCEDURE — 25000003 PHARM REV CODE 250: Performed by: SPECIALIST

## 2022-08-01 PROCEDURE — 99231 SBSQ HOSP IP/OBS SF/LOW 25: CPT | Mod: ,,, | Performed by: INTERNAL MEDICINE

## 2022-08-01 PROCEDURE — 25000003 PHARM REV CODE 250: Performed by: INTERNAL MEDICINE

## 2022-08-01 PROCEDURE — 27000221 HC OXYGEN, UP TO 24 HOURS

## 2022-08-01 PROCEDURE — 94761 N-INVAS EAR/PLS OXIMETRY MLT: CPT

## 2022-08-01 PROCEDURE — 63600175 PHARM REV CODE 636 W HCPCS: Performed by: INTERNAL MEDICINE

## 2022-08-01 PROCEDURE — 99231 PR SUBSEQUENT HOSPITAL CARE,LEVL I: ICD-10-PCS | Mod: ,,, | Performed by: INTERNAL MEDICINE

## 2022-08-01 PROCEDURE — S0179 MEGESTROL 20 MG: HCPCS | Performed by: INTERNAL MEDICINE

## 2022-08-01 PROCEDURE — 11000001 HC ACUTE MED/SURG PRIVATE ROOM

## 2022-08-01 RX ORDER — ENOXAPARIN SODIUM 100 MG/ML
30 INJECTION SUBCUTANEOUS EVERY 24 HOURS
Status: DISCONTINUED | OUTPATIENT
Start: 2022-08-02 | End: 2022-08-02 | Stop reason: HOSPADM

## 2022-08-01 RX ADMIN — FAMOTIDINE 20 MG: 20 TABLET, FILM COATED ORAL at 09:08

## 2022-08-01 RX ADMIN — SENNOSIDES AND DOCUSATE SODIUM 2 TABLET: 50; 8.6 TABLET ORAL at 08:08

## 2022-08-01 RX ADMIN — METOPROLOL SUCCINATE 50 MG: 50 TABLET, EXTENDED RELEASE ORAL at 09:08

## 2022-08-01 RX ADMIN — HYDRALAZINE HYDROCHLORIDE 25 MG: 25 TABLET ORAL at 09:08

## 2022-08-01 RX ADMIN — MEGESTROL ACETATE 200 MG: 40 SUSPENSION ORAL at 09:08

## 2022-08-01 RX ADMIN — FUROSEMIDE 20 MG: 10 INJECTION INTRAMUSCULAR; INTRAVENOUS at 01:08

## 2022-08-01 RX ADMIN — POLYETHYLENE GLYCOL 3350 17 G: 17 POWDER, FOR SOLUTION ORAL at 08:08

## 2022-08-01 RX ADMIN — ACETAMINOPHEN 500 MG: 500 TABLET, FILM COATED ORAL at 09:08

## 2022-08-01 RX ADMIN — POLYETHYLENE GLYCOL 3350 17 G: 17 POWDER, FOR SOLUTION ORAL at 09:08

## 2022-08-01 RX ADMIN — HYDRALAZINE HYDROCHLORIDE 25 MG: 25 TABLET ORAL at 08:08

## 2022-08-01 RX ADMIN — LISINOPRIL 20 MG: 20 TABLET ORAL at 09:08

## 2022-08-01 RX ADMIN — ACETAMINOPHEN 500 MG: 500 TABLET, FILM COATED ORAL at 04:08

## 2022-08-01 RX ADMIN — MIRTAZAPINE 7.5 MG: 7.5 TABLET ORAL at 08:08

## 2022-08-01 RX ADMIN — LEVOTHYROXINE SODIUM 50 MCG: 50 TABLET ORAL at 05:08

## 2022-08-01 RX ADMIN — LISINOPRIL 20 MG: 20 TABLET ORAL at 08:08

## 2022-08-01 RX ADMIN — ENOXAPARIN SODIUM 30 MG: 30 INJECTION SUBCUTANEOUS at 09:08

## 2022-08-01 NOTE — PLAN OF CARE
[facility] will let me know when equipment is in the home and then it will be when grijalva discharged.  I anticipate dc today  [facility] I also have packet for Glory with [facility] at the desk.

## 2022-08-01 NOTE — PLAN OF CARE
Glory has met with pt and son. Plan is for discharge today home with [facility] when Dr Hale rounds.Equipment will be delivered to the home in the next few hours. It is anticipated dr Hale will round around 1730.   Pt is in will call status with [facility] [id number]  Glory's phone number is [phone] [facility].  Packet is complete except for the discharge info Dr Hale will add. Nursing aware of above.

## 2022-08-01 NOTE — PLAN OF CARE
Glory called that equipment is in the home but son reports trouble with airconditioner. He feels he needs to get this repaired prior to dc.   Glory will update me. Dr Hale has not rounded yet.

## 2022-08-01 NOTE — PT/OT/SLP DISCHARGE
Occupational Therapy Discharge Summary    Leidy Hdez  MRN: [id number]   Principal Problem: Nondisplaced intertrochanteric fracture of left femur, init      Patient Discharged from acute Occupational Therapy on 8/1.  Please refer to prior OT note dated 7/22 for functional status.    Assessment:      Patient has not met goals. Pt d/c'd from OT services d/t being unable to participate and consecutive refusals. Educated pt's son on OT POC. Pt's son reported pt has had poor intake and is too weak and not appropriate to participate in therapy at this time. OT to sign off- pt's son in agreement. Please reconsult if status changes.     Objective:     GOALS:   Multidisciplinary Problems     Occupational Therapy Goals        Problem: Occupational Therapy    Goal Priority Disciplines Outcome Interventions   Occupational Therapy Goal     OT, PT/OT Ongoing, Progressing    Description: Goals to be met by: 7/3  Patient will increase functional independence with ADLs by performing:    LE Dressing with Minimal Assistance.  Grooming while standing at sink with Stand-by Assistance.  Toileting from toilet with Stand-by Assistance for hygiene and clothing management.   Toilet transfer to toilet with Stand-by Assistance.                     Reasons for Discontinuation of Therapy Services  Patient declines to continue care.      Plan:     Patient Discharged to: TBD    8/1/2022

## 2022-08-01 NOTE — PROGRESS NOTES
"Progress Note  Hematology/Oncology      Consult Requested By: Yoni Hale MD    Reason for Consult: Breast mass    SUBJECTIVE:     History of Present Illness:  Patient is a 89 y.o.  female presents after a fall.  I speak Algerian therefore I did not need an .  She sustained a nondisplaced fracture to the left femur and is status post ORIF of the left hip intertrochanteric femur fracture.     CTA of the chest with no evidence of pulmonary embolism but showed enlarged paratracheal, prevascular and subcarinal lymphadenopathy.  Subcarinal lymph node measures 3.2 x 2.2 cm.  Right perihilar area of soft tissue thickening measuring 3.3 cm suspicious for malignancy.  Small right pleural effusion.  Lobulated mass in the right breast measuring 3.7 x 2.6 cm.     Patient reports that about a year ago she fell and hit her right breast.  She felt a mass but she reports that has been the same size since then.  She report sister diagnosed with breast cancer in in her 80s.    Patient is seen today in rounds and she is lying in bed.  Son at bedside.    7/22/2022: Patient is seen in rounds this morning. Son at bedside. He told me that after I left she start crying because of the word "cancer". He does not want me to use the word Cancer in front of her. She is doing good today. She feels better. She told me that she exercise  this morning. NO fever, chills, sweats.    7/25/2022: Patient is seen in rounds this morning. She reports that she is not doing so good today. She reports some nausea and abdominal discomfort. She reports chest pain and some shortness of breath. Noticed mild tachypnea but no distress. She was seen by Dr Hale this AM and CXR today Cardiopericardial silhouette is within normal limits.  There is interval size increase of right pleural effusion and further right lung atelectasis.  Left lung is clear.  No apparent pneumothorax. Her blood pressure is little more elevated this morning. "     7/26/2022: Came to see the patient and she is resting peacefully. Her son was not there today. Plan for thoracentesis tomorrow.  No new problems or concerns. Just asking when she can go home.    7/28/2022: Patient is seen in rounds at lunch time,. She is by herself. She is laying in bed with oxymask.  Her breathing is better. She underwent thoracentesis yesterday. 800 cc's of fluid removed. She is more alert today.    7/29/2022: Patient is seen today in round this morning. She was by herself. She was sleeping comfortable. No fever, chills. No CP.    8/1/2022: Spoke with son about the results and that she is indeed stage IV breast can, therefore is not curable. RIGHT PLEURAL FLUID, THORACENTESIS: METASTATIC MAMMARY CARCINOMA. Explained to him that she will NOT need a breast mass biopsy as the pleural fluid gave us the diagnosis.  She has decline slowly through the course of her hospitalization. I do not think that endocrine therapy will help at this time and risks outweigh benefits. She is not a candidate for chemotherapy therefore, hospice is her best option at this time.     Hospice: I had a long conversation with the patient regarding end of life issues, palliative, comfort care as well as hospice. Explained that Palliative care services focuses on providing patients with relief from the symptoms. The goal is to improve quality of life for both the patient and the family. Palliative care focuses on symptoms such as pain, shortness of breath, fatigue, constipation, nausea, loss of appetite, difficulty sleeping and depression, etc. Hospice focuses on caring, not curing and in most cases care is provided in the patient's home. This is offer when patient's are not a candidate for aggressive chemotherapy or treatment or if patient's performance status is not acceptable for treatment. This service goes hand to hand with comfort care and is provided to patients that prognosis is expected to be <  6 months.    He  decided take her home with hospice.    Continuous Infusions:   dextrose 5 %       Scheduled Meds:   acetaminophen  500 mg Oral 6 times per day    enoxaparin  30 mg Subcutaneous Q12H    famotidine  20 mg Oral Daily    furosemide (LASIX) injection  20 mg Intravenous Q12H    hydrALAZINE  25 mg Oral Q12H    levothyroxine  50 mcg Oral Daily    lisinopriL  20 mg Oral BID    megestroL  200 mg Oral Daily    metoprolol succinate  50 mg Oral Daily    mirtazapine  7.5 mg Oral QHS    polyethylene glycol  17 g Oral BID    senna-docusate 8.6-50 mg  2 tablet Oral QHS     PRN Meds:albuterol-ipratropium, aluminum-magnesium hydroxide-simethicone, calcium carbonate, cloNIDine, diphenhydrAMINE, HYDROmorphone, lactulose, melatonin, methocarbamoL, morphine, ondansetron, ondansetron, traMADoL    No past medical history on file.  Past Surgical History:   Procedure Laterality Date    INTRAMEDULLARY RODDING OF FEMUR Left 7/19/2022    Procedure: INSERTION, INTRAMEDULLARY VERNELL, FEMUR;  Surgeon: Antonio Matthew MD;  Location: [facility];  Service: Orthopedics;  Laterality: Left;    THORACENTESIS  7/27/2022          No family history on file.       Review of patient's allergies indicates:   Allergen Reactions    Atropine     Diazepam     Promethazine       No current facility-administered medications on file prior to encounter.     Current Outpatient Medications on File Prior to Encounter   Medication Sig Dispense Refill    atorvastatin (LIPITOR) 10 MG tablet Take 10 mg by mouth once daily.      hydrALAZINE (APRESOLINE) 50 MG tablet TAKE 1/2 (ONE-HALF) TABLET BY MOUTH TWICE DAILY. IF BLOOD PRESSURE AVERAGE IS GREATEER THAN 130/80 AFTER 2 DAYS, INCREASE TO 1 TABLET TWICE DAILY. IF BLOOD PRESSURE AVERAGE IS GREATER THAN 130/80 AFTER 2 DAYS, INCREASE TO 2 TABLETS TWICE DAILY      levothyroxine (SYNTHROID) 50 MCG tablet Take 50 mcg by mouth once daily.      lisinopriL (PRINIVIL,ZESTRIL) 20 MG tablet Take 20 mg by mouth 2 (two) times  daily.      metoprolol succinate (TOPROL-XL) 50 MG 24 hr tablet Take 50 mg by mouth once daily.         Review of Systems   Constitutional: Positive for fatigue. Negative for activity change, appetite change, chills, fever and unexpected weight change.   HENT: Negative for mouth dryness, mouth sores, nosebleeds, sore throat and trouble swallowing.    Eyes: Negative for visual disturbance.   Respiratory: Positive for shortness of breath. Negative for cough.    Cardiovascular: Negative for chest pain, palpitations and leg swelling.   Gastrointestinal: Negative for abdominal distention, abdominal pain, blood in stool, change in bowel habit, constipation, diarrhea, nausea, vomiting and change in bowel habit.   Endocrine: Negative.    Genitourinary: Negative for dysuria, frequency, hematuria and urgency.   Musculoskeletal: Negative for arthralgias, back pain, myalgias and neck pain.   Integumentary:  Negative for rash, breast mass, breast discharge and breast tenderness.   Neurological: Positive for weakness. Negative for dizziness, tremors, syncope, speech difficulty, light-headedness, numbness, headaches and memory loss.   Hematological: Does not bruise/bleed easily.   Psychiatric/Behavioral: Negative for confusion and suicidal ideas.   Breast: Negative for mass and tenderness        OBJECTIVE:     Vital Signs (Most Recent)  Temp: 97.8 °F (36.6 °C) (08/01/22 1116)  Pulse: 82 (08/01/22 1116)  Resp: 17 (08/01/22 1116)  BP: (!) 92/55 (08/01/22 1116)  SpO2: 95 % (08/01/22 1116)    Pain Assessment: No pain reported at this time    Vital Signs Range (Last 24H):  Temp:  [96.6 °F (35.9 °C)-98.1 °F (36.7 °C)]   Pulse:  [72-89]   Resp:  [17-20]   BP: ()/(55-70)   SpO2:  [91 %-97 %]         Laboratory:  CBC with Differential:  No results for input(s): WBC, NEUTROPCT, LYMPH, MONOPCT, EOSPCT, BASOPHIL, RBC, HCT, HGB, MCV, MCH, RDW, PLT, MPV in the last 24 hours.    Invalid input(s): MCMC  CMP:  No results for input(s): GLU,  CALCIUM, ALBUMIN, PROT, NA, K, CO2, CL, BUN, CREATININE, ALKPHOS, ALT, AST, BILITOT in the last 24 hours.  BMP:   No results for input(s): GLU, CALCIUM, NA, K, CO2, CL, BUN, CREATININE in the last 24 hours.   Latest Reference Range & Units 07/18/22 17:42   Alkaline Phosphatase 40 - 150 unit/L 76   PROTEIN TOTAL 5.8 - 7.6 gm/dL 6.4   Albumin 3.4 - 4.8 gm/dL 3.2 (L)   Albumin/Globulin Ratio 1.1 - 2.0 ratio 1.0 (L)   BILIRUBIN TOTAL <=1.5 mg/dL 0.9   AST 5 - 34 unit/L 23   ALT 0 - 55 unit/L 23   Globulin, Total 2.4 - 3.5 gm/dL 3.2   (L): Data is abnormally lowTumor Markers: No results for input(s): PSA, CEA, , AFPTM, YY3159,  in the last 24 hours.    Invalid input(s): ALGTM  Immunology: No results for input(s): SPEP, FRANCES, ELIAN, FREELAMBDALI in the last 24 hours.  Coagulation: No results for input(s): PT, INR, APTT in the last 24 hours.  Specimen (24h ago, onward)            None        Microbiology Results (last 7 days)     Procedure Component Value Units Date/Time    Body Fluid Culture [[id number]] Collected: 07/27/22 0940    Order Status: Completed Specimen: Body Fluid Updated: 08/01/22 0715     Body Fluid Culture No Growth at 5 days    Gram Stain [[id number]] Collected: 07/27/22 0830    Order Status: Completed Specimen: Body Fluid Updated: 07/27/22 1332     GRAM STAIN No WBCs, No bacteria seen           Diagnostic Results:  Imaging Results          CTA Chest Non-Coronary (PE Study) (Final result)  Result time 07/18/22 20:30:20    Final result by Kodak Young MD (07/18/22 20:30:20)                 Impression:      1. No evidence of pulmonary embolism through the level of the subsegmental pulmonary arteries.  2. Lobulated mass in the right breast suspicious for malignancy.  3. Lobulated right perihilar soft tissue thickening suspicious for malignancy with metastatic mediastinal adenopathy.      Electronically signed by: Kodak Young MD  Date:    07/18/2022  Time:    20:30             Narrative:     EXAMINATION:  CTA CHEST NON CORONARY    CLINICAL HISTORY:  Pulmonary embolism (PE) suspected, unknown D-dimer;    TECHNIQUE:  Axial images of the chest were obtained with IV contrast administration.  Coronal and sagittal reconstructions were provided.  Three dimensional and MIP images were obtained and evaluated.  Total DLP was 183 mGy-cm. Dose lowering technique and automated exposure control were utilized for this exam.    COMPARISON:  Chest radiograph 07/18/2022.    FINDINGS:  There is no filling defect within the pulmonary arteries through the level of the subsegmental pulmonary arteries.  There is no CT evidence of right heart strain.    The airway is widely patent.  There is enlarged paratracheal, prevascular, and subcarinal lymphadenopathy.  A representative subcarinal lymph node measures 3.2 x 2.2 cm.    There is a right perihilar area of soft tissue thickening measuring approximately 3.3 cm suspicious for malignancy.  There is a small right pleural effusion.  There is lower lobe atelectasis.    There is a lobulated mass in the right breast measuring 3.7 x 2.6 cm.  The upper abdomen demonstrates a small hiatal hernia.  There is no lytic or blastic osseous lesion.                               X-Ray Chest AP Portable (Final result)  Result time 07/18/22 18:26:37    Final result by Selwyn Pink MD (07/18/22 18:26:37)                 Impression:      Areas of hazy right lung opacity, greatest inferiorly.  Some of this may relate to overlying soft tissue, but pneumonia and right pleural effusion also possible.  Follow-up PA and lateral chest radiographs can be considered.      Electronically signed by: Selwyn Pink  Date:    07/18/2022  Time:    18:26             Narrative:    EXAMINATION:  XR CHEST AP PORTABLE    CLINICAL HISTORY:  hypoxemia;    TECHNIQUE:  Frontal view(s) of the chest.    COMPARISON:  Radiography 01/09/2018    FINDINGS:  Hazy opacity in the lower right lung and extending peripherally over  the mid and upper right lung.  No consolidation or significant pleural fluid on the left.  No definitive pneumothorax.  Cardiac silhouette within normal limits for technique.                               X-Ray Femur Ap/Lat Left (Final result)  Result time 07/18/22 18:09:34    Final result by Hayley Wilson MD (07/18/22 18:09:34)                 Impression:      Intertrochanteric fracture left hip      Electronically signed by: Hayley Wilson  Date:    07/18/2022  Time:    18:09             Narrative:    EXAMINATION:  XR FEMUR 2 VIEW LEFT    CLINICAL HISTORY:  Injury, unspecified, initial encounter    TECHNIQUE:  AP and lateral views of the left femur were performed.    COMPARISON:  None}    FINDINGS:  There is an inter trochanteric fracture of the left hip.  No significant displacement is seen.  No angulation is seen.  No other fractures are seen.                               CT Pelvis Without Contrast (Final result)  Result time 07/18/22 18:08:48    Final result by Hayley Wilson MD (07/18/22 18:08:48)                 Impression:      Intertrochanteric fracture seen on the left side as outlined above      Electronically signed by: Hayley Wilson  Date:    07/18/2022  Time:    18:08             Narrative:    EXAMINATION:  CT PELVIS WITHOUT CONTRAST    CLINICAL HISTORY:  Pelvic fracture;    TECHNIQUE:  Multiple axial images were obtained of the pelvis from the iliac crest to the pubic symphysis and sagittal and coronal reconstructions were performed.  No contrast was administered.    COMPARISON:  None    FINDINGS:  There is a fracture along the intertrochanteric region on the left side.  Fracture is not displaced.  There is comminution of the fracture at the level of the greater trochanter.  The ischium appears to be intact on the right and left side.  Iliac bone is intact on the right and left side.  The right proximal femur appears to be intact.  The sacrum is intact.                                     ASSESSMENT/PLAN:     Patient Active Problem List   Diagnosis    Nondisplaced intertrochanteric fracture of left femur, init    Mass of axillary tail of right breast    Adenopathy    Malignant pleural effusion    Stage IV breast cancer in female        Right breast mass       Anemia       Pleural effusion--malignant--breast primary    Plan  S/p thoracentesis 7/27/2022--breathing easier  On O2  Cytology --malignant--breast primary   As mentioned before:  If malignant pleural effusion (most likely), will no recommend biopsy of breast mass as we will have Dx already and  will recommend hospice and palliative care.    They have decided to go home with Hospice.   I agree with decision of hospice    God bless the patient and her family!    I will be available for any questions      Almaz Kelley MD  Hematology/Oncology  [facility]

## 2022-08-01 NOTE — PT/OT/SLP PROGRESS
Physical Therapy         Treatment        Leidy Hdez   MRN: [id number]        PT Start Time: 0851     PT Stop Time: 0914    PT Total Time (min): 23 min       Billable Minutes:  Therapeutic Exercise 23  Total Minutes: 23    Treatment Type: Treatment  PT/PTA: PTA     PTA Visit Number: 5       General Precautions: Standard, fall  Orthopedic Precautions: Orthopedic Precautions : LLE weight bearing as tolerated   Braces: Braces: N/A    Spiritual, Cultural Beliefs, Methodist Practices, Values that Affect Care: no    Subjective:  Communicated with nurse and son prior to session.  Pt only in agreement to perform therex in bed at this time, spoke to son with education given on POC appropriateness for pt, spoke to PT as well about therapy session and son's concerns  Pain/Comfort  Pain Rating 1: 0/10    Objective:  Patient found laying in bed upon arrival, with Patient found with: oxygen, SCD, pressure relief boots    Functional Mobility:    Additional Treatment:  Supine therex, AAROM 10x BLE  Ankle pumps, heel slides, SAQ, hip abd/add, SLR    Activity Tolerance:  Patient tolerated treatment well and Patient limited by fatigue    Patient left HOB elevated with all lines intact, call button in reach and son present.    Assessment:  Leidy Hdez is a 89 y.o. female with a medical diagnosis of Stage IV breast cancer in female. Pt r/f to get OOB at this time with son present    Rehab potential is good and fair.    Activity tolerance: Fair and Poor    Discharge recommendations: Discharge Facility/Level of Care Needs: nursing facility, skilled, other (see comments) (per CM hospice d/c)     Equipment recommendations: Equipment Needed After Discharge: walker, rolling, commode     GOALS:   Multidisciplinary Problems     Physical Therapy Goals        Problem: Physical Therapy    Goal Priority Disciplines Outcome Goal Variances Interventions   Physical Therapy Goal     PT, PT/OT Ongoing, Progressing     Description: Goals to  be met by: 2022     Patient will increase functional independence with mobility by performin. Supine to sit with Stand-by Assistance  2. Sit to supine with Stand-by Assistance  3. Sit to stand transfer with Stand-by Assistance  4. Gait  x 300 feet with Supervision using Rolling Walker.                      PLAN:    Patient to be seen daily  to address the above listed problems via therapeutic exercises  Plan of Care expires: 22  Plan of Care reviewed with: patient, son         2022

## 2022-08-02 VITALS
HEART RATE: 75 BPM | DIASTOLIC BLOOD PRESSURE: 63 MMHG | SYSTOLIC BLOOD PRESSURE: 115 MMHG | RESPIRATION RATE: 18 BRPM | BODY MASS INDEX: 19.46 KG/M2 | OXYGEN SATURATION: 96 % | WEIGHT: 109.81 LBS | TEMPERATURE: 99 F | HEIGHT: 63 IN

## 2022-08-02 PROBLEM — S72.145A: Status: RESOLVED | Noted: 2022-07-19 | Resolved: 2022-08-02

## 2022-08-02 PROCEDURE — 63600175 PHARM REV CODE 636 W HCPCS: Performed by: INTERNAL MEDICINE

## 2022-08-02 PROCEDURE — 27000221 HC OXYGEN, UP TO 24 HOURS

## 2022-08-02 PROCEDURE — S0179 MEGESTROL 20 MG: HCPCS | Performed by: INTERNAL MEDICINE

## 2022-08-02 PROCEDURE — 25000003 PHARM REV CODE 250: Performed by: SPECIALIST

## 2022-08-02 PROCEDURE — 94799 UNLISTED PULMONARY SVC/PX: CPT

## 2022-08-02 PROCEDURE — 99900035 HC TECH TIME PER 15 MIN (STAT)

## 2022-08-02 PROCEDURE — 25000003 PHARM REV CODE 250: Performed by: INTERNAL MEDICINE

## 2022-08-02 RX ORDER — MIRTAZAPINE 7.5 MG/1
7.5 TABLET, FILM COATED ORAL NIGHTLY
Qty: 30 TABLET | Refills: 11 | Status: SHIPPED | OUTPATIENT
Start: 2022-08-02 | End: 2023-08-02

## 2022-08-02 RX ORDER — HYDRALAZINE HYDROCHLORIDE 25 MG/1
25 TABLET, FILM COATED ORAL EVERY 12 HOURS
Qty: 60 TABLET | Refills: 11 | Status: SHIPPED | OUTPATIENT
Start: 2022-08-02 | End: 2023-08-02

## 2022-08-02 RX ORDER — MEGESTROL ACETATE 40 MG/ML
200 SUSPENSION ORAL DAILY
Qty: 10 ML | Refills: 1 | Status: SHIPPED | OUTPATIENT
Start: 2022-08-03

## 2022-08-02 RX ORDER — POLYETHYLENE GLYCOL 3350 17 G/17G
17 POWDER, FOR SOLUTION ORAL 2 TIMES DAILY
Qty: 20 PACKET | Refills: 3 | Status: SHIPPED | OUTPATIENT
Start: 2022-08-02

## 2022-08-02 RX ADMIN — LISINOPRIL 20 MG: 20 TABLET ORAL at 08:08

## 2022-08-02 RX ADMIN — FUROSEMIDE 20 MG: 10 INJECTION INTRAMUSCULAR; INTRAVENOUS at 01:08

## 2022-08-02 RX ADMIN — ACETAMINOPHEN 500 MG: 500 TABLET, FILM COATED ORAL at 08:08

## 2022-08-02 RX ADMIN — FAMOTIDINE 20 MG: 20 TABLET, FILM COATED ORAL at 08:08

## 2022-08-02 RX ADMIN — HYDRALAZINE HYDROCHLORIDE 25 MG: 25 TABLET ORAL at 08:08

## 2022-08-02 RX ADMIN — POLYETHYLENE GLYCOL 3350 17 G: 17 POWDER, FOR SOLUTION ORAL at 08:08

## 2022-08-02 RX ADMIN — MEGESTROL ACETATE 200 MG: 40 SUSPENSION ORAL at 08:08

## 2022-08-02 RX ADMIN — METOPROLOL SUCCINATE 50 MG: 50 TABLET, EXTENDED RELEASE ORAL at 08:08

## 2022-08-02 RX ADMIN — ACETAMINOPHEN 500 MG: 500 TABLET, FILM COATED ORAL at 11:08

## 2022-08-02 RX ADMIN — FUROSEMIDE 20 MG: 10 INJECTION INTRAMUSCULAR; INTRAVENOUS at 11:08

## 2022-08-02 RX ADMIN — LEVOTHYROXINE SODIUM 50 MCG: 50 TABLET ORAL at 05:08

## 2022-08-02 NOTE — PROGRESS NOTES
[facility]                       [address] [id number]    PATIENT NAME:       MATTHEW JOHNSON  YOB: 1925  CSN:                [id number]   MRN:                [id number]  ADMIT DATE:         07/18/2022 16:47:00  PHYSICIAN:          Yoni Hale MD                            PROGRESS NOTE    DATE:      SUBJECTIVE:  A 89-year-old  [place] female.  She fell and had a   femur fracture on the left.  She underwent ORIF.  She was found to have a right   breast mass.  She started presenting with shortness of breath.  She was found to   have a right pleural effusion.  She had a tap and she has malignant cells in   the pleural effusion.  Report is still pending at the present time.  She has   been with oxygen at times.  She is alert with some confusion.  No significant   shortness of breath.  No fever, chills, or other problems.    REVIEW OF SYSTEMS:  Times 12 as above.    OBJECTIVE:  VITAL SIGNS:  Blood pressure is 122/68, pulse is 75, temp 98.    GENERAL:  She is alert, in no acute distress.    HEART:  Regular rhythm and rate.  LUNGS:  She has decreased breath sounds in the right lower base.    EXTREMITIES:  She has trace edema.  No clubbing or cyanosis.    NEUROLOGIC:  Nonfocal.  She has slight confusion.    LABORATORY DATA:  There are no new labs.    IMPRESSION:    1. Status post fall with left femur fracture.    2. She has a right breast mass with a right pleural effusion.  It did show   metastatic mammary carcinoma in the cytology on the pleural fluid.  She has   stage IV breast cancer.  She is not a candidate for chemotherapy, and hospice   had been appropriate.    PLAN:  Will continue with comfort care.  She is hopefully going tomorrow home   with hospice.    ______________________________  Yoni Hale MD    KAVITA/AQS  DD:  08/01/2022  Time:  08:35PM  DT:  08/01/2022  Time:   08:44PM  Job #:  [id number]      PROGRESS NOTE

## 2022-08-02 NOTE — PLAN OF CARE
Calling office for discharge orders. His office said he is finishing his morning pts now and they will pass onto dr that we are waiting on dc order for this pt.

## 2022-08-02 NOTE — PLAN OF CARE
victoriano is ready for dc home except for a dc order. Dr Hale aware and will put in DC order per nursing.

## 2022-08-02 NOTE — DISCHARGE SUMMARY
[facility]                       [address] [id number]    PATIENT NAME:       MATTHEW JOHNSON  YOB: 1925  CSN:                [id number]   MRN:                [id number]  ADMIT DATE:         07/18/2022 16:47:00  PHYSICIAN:          Yoni Hale MD                            PROGRESS NOTE    DATE:      SUBJECTIVE:  This is a 89-year-old  fellow American white female.  She   had a fall and had a left femur fracture that was repaired.  She was found to   have a mass in the right breast and she developed shortness of breath.  She was   found to have a right pleural effusion.  Patient had a thoracentesis and   eventually she was found to have malignant cells of a breast malignancy.  It   shows metastatic mammary carcinoma.  She had been with poor appetite and other   than that, she has been doing pretty decently.  She does get short of breath   when she moves around.  Pain is much improved.    OBJECTIVE:  VITAL SIGNS:  Have been stable.  Today, blood pressure 122/68, pulse   75, and temp 98.  GENERAL APPEARANCE:  She is alert, in no acute distress.  HEART:  Regular rhythm and rate.  LUNGS:  She has decreased breath sounds in the right lower base.  ABDOMEN:  Soft, nontender.  Bowel sounds positive and normal.  EXTREMITIES:  She has some edema.  No clubbing, cyanosis.  Wound looks okay.  NEUROLOGIC:  Nonfocal.    LABORATORY:  There are no new labs.    ASSESSMENT:  Status post fall with a left femoral fracture, status post open   reduction internal fixation.  She had a right breast mass with right pleural   effusion that was found to have metastatic mammary carcinoma.  She has some   hyponatremia, significant deconditioning and protein calorie malnutrition with   poor appetite.  She has history of hypertension, osteoarthritis, hypothyroidism,   dyslipidemia, anemia.    PLAN:  The patient will  continue with comfort care.  Will continue sats over 90.    Arrangements have been underway for her to go home with hospice.  She will   continue with her medications including Lasix, levothyroxine, lisinopril,   megestrol, Toprol, mirtazapine, MiraLAX.  Patient will go home with hospice as   soon as all of the arrangements are made and all the DMEs are in place.  God   bless her.      If she goes home today, use this as a discharge summary.        ______________________________  MD KAVIAT Arenas/WANG  DD:  08/02/2022  Time:  07:56AM  DT:  08/02/2022  Time:  08:23AM  Job #:  [id number]      PROGRESS NOTE

## 2022-08-02 NOTE — PT/OT/SLP PROGRESS
Physical Therapy      Patient Name:  Leidy Hdez   MRN:  [id number]    Patient not seen today secondary to pt dc home w/ hospice today per CM. Will follow-up if needed.

## 2023-05-17 NOTE — PROGRESS NOTES
[facility]                       [address] [address]    PATIENT NAME:       MATTHEW JOHNSON  YOB: 1925  CSN:                [id number]   MRN:                [id number]  ADMIT DATE:         07/18/2022 16:47:00  PHYSICIAN:          Yoni Hale MD                            PROGRESS NOTE    DATE:      SUBJECTIVE:  This is a 89-year-old  [place] female, status post   fall with a left hip fracture.  She has a right breast mass with some changes in   the lungs.  She is sleepy, but she is arousable.  No problems.  Her O2 sat was   in the high 80s, almost 90.  No fever or chills.  No shortness of breath.  No   chest pain.  No palpitations or any other problems.    REVIEW OF SYSTEMS:  X12 as above.    PHYSICAL EXAMINATION:  VITAL SIGNS:  Blood pressure this morning is 124/73, pulse 74, and temp 97.6.  GENERAL APPEARANCE:  She is alert, in no acute distress.  HEART:  Regular rhythm rate with a murmur.  LUNGS:  Clear.  ABDOMEN:  Soft, nontender.  EXTREMITIES:  No clubbing, cyanosis, or edema.  NEUROLOGIC:  Nonfocal.    LABS:  Remarkable for an H and H of 10.1 and 31.2.  Sodium 131, CO2 of 17, BUN   22.3, creatinine 1.24, glucose 139.    IMPRESSION:    1. Status post fall with a left intertrochanteric fracture.  2. Right breast mass with possible metastasis.  3. Hypertension.  4. Dyslipidemia.  5. Hypothyroidism.  6. Osteoarthritis.  7. Mild dehydration.  8. Mild postoperative anemia.    PLAN:    1. Will mobilize with Therapy as tolerated.  2. Will continue DVT prophylaxis.  3. Will monitor vital signs, including blood pressure.        ______________________________  Yoni Hale MD    KAVITA/AQS  DD:  07/20/2022  Time:  08:42AM  DT:  07/20/2022  Time:  09:27AM  Job #:  [id number]      PROGRESS NOTE       05/17/23 1703   IMM Letter   IMM Letter given to Patient/Family/Significant other/Guardian/POA/by: presented to patient and friend at bedside by delfin Johnson   IMM Letter date given: 05/17/23   IMM Letter time given: 1703

## 2024-11-11 NOTE — CONSULTS
Consult Note  Hematology/Oncology      Consult Requested By: Yoni Hale MD    Reason for Consult: Breast mass    SUBJECTIVE:     History of Present Illness:  Patient is a 89 y.o.  female presents after a fall.  I speak Yi therefore I did not need an .  She sustained a nondisplaced fracture to the left femur and is status post ORIF of the left hip intertrochanteric femur fracture.     CTA of the chest with no evidence of pulmonary embolism but showed enlarged paratracheal, prevascular and subcarinal lymphadenopathy.  Subcarinal lymph node measures 3.2 x 2.2 cm.  Right perihilar area of soft tissue thickening measuring 3.3 cm suspicious for malignancy.  Small right pleural effusion.  Lobulated mass in the right breast measuring 3.7 x 2.6 cm.     Patient reports that about a year ago she fell and hit her right breast.  She felt a mass but she reports that has been the same size since then.  She report sister diagnosed with breast cancer in in her 80s.    Patient is seen today in rounds and she is lying in bed.  Son at bedside.    Continuous Infusions:   sodium chloride 0.9% 75 mL/hr at 07/19/22 2000     Scheduled Meds:   acetaminophen  500 mg Oral 6 times per day    [START ON 7/22/2022] bisacodyL  10 mg Rectal Daily    famotidine  20 mg Oral Daily    hydrALAZINE  25 mg Oral Q12H    levothyroxine  50 mcg Oral Daily    lisinopriL  20 mg Oral BID    metoprolol succinate  50 mg Oral Daily    polyethylene glycol  17 g Oral BID    rivaroxaban  10 mg Oral Daily with dinner    senna-docusate 8.6-50 mg  2 tablet Oral QHS     PRN Meds:aluminum-magnesium hydroxide-simethicone, calcium carbonate, diphenhydrAMINE, HYDROmorphone, lactulose, melatonin, methocarbamoL, ondansetron, ondansetron, traMADoL    No past medical history on file.  Past Surgical History:   Procedure Laterality Date    INTRAMEDULLARY RODDING OF FEMUR Left 7/19/2022    Procedure: INSERTION, INTRAMEDULLARY VERNELL, FEMUR;   Surgeon: Antonio Matthew MD;  Location: [facility];  Service: Orthopedics;  Laterality: Left;     No family history on file.       Review of patient's allergies indicates:   Allergen Reactions    Atropine     Diazepam     Promethazine       No current facility-administered medications on file prior to encounter.     Current Outpatient Medications on File Prior to Encounter   Medication Sig Dispense Refill    atorvastatin (LIPITOR) 10 MG tablet Take 10 mg by mouth once daily.      hydrALAZINE (APRESOLINE) 50 MG tablet TAKE 1/2 (ONE-HALF) TABLET BY MOUTH TWICE DAILY. IF BLOOD PRESSURE AVERAGE IS GREATEER THAN 130/80 AFTER 2 DAYS, INCREASE TO 1 TABLET TWICE DAILY. IF BLOOD PRESSURE AVERAGE IS GREATER THAN 130/80 AFTER 2 DAYS, INCREASE TO 2 TABLETS TWICE DAILY      levothyroxine (SYNTHROID) 50 MCG tablet Take 50 mcg by mouth once daily.      lisinopriL (PRINIVIL,ZESTRIL) 20 MG tablet Take 20 mg by mouth 2 (two) times daily.      metoprolol succinate (TOPROL-XL) 50 MG 24 hr tablet Take 50 mg by mouth once daily.         Review of Systems   Constitutional: Negative for activity change, appetite change, chills, fatigue, fever and unexpected weight change.   HENT: Negative for mouth dryness, mouth sores, nosebleeds, sore throat and trouble swallowing.    Eyes: Negative for visual disturbance.   Respiratory: Negative for cough and shortness of breath.    Cardiovascular: Negative for chest pain, palpitations and leg swelling.   Gastrointestinal: Negative for abdominal distention, abdominal pain, blood in stool, change in bowel habit, constipation, diarrhea, nausea, vomiting and change in bowel habit.   Endocrine: Negative.    Genitourinary: Negative for dysuria, frequency, hematuria and urgency.   Musculoskeletal: Negative for arthralgias, back pain, myalgias and neck pain.   Integumentary:  Negative for rash, breast mass, breast discharge and breast tenderness.   Neurological: Negative for dizziness, tremors, syncope,  speech difficulty, weakness, light-headedness, numbness, headaches and memory loss.   Hematological: Does not bruise/bleed easily.   Psychiatric/Behavioral: Negative for confusion and suicidal ideas.   Breast: Negative for mass and tenderness        OBJECTIVE:     Vital Signs (Most Recent)  Temp: 97.7 °F (36.5 °C) (07/21/22 1126)  Pulse: 72 (07/21/22 1126)  Resp: 18 (07/21/22 1126)  BP: 124/72 (07/21/22 1126)  SpO2: (!) 94 % (07/21/22 1126)    Pain Assessment: No pain reported at this time    Vital Signs Range (Last 24H):  Temp:  [97.5 °F (36.4 °C)-97.8 °F (36.6 °C)]   Pulse:  [72-87]   Resp:  [16-18]   BP: (110-174)/(61-92)   SpO2:  [92 %-95 %]     Physical Exam:  Physical Exam  Vitals and nursing note reviewed.   Constitutional:       General: She is not in acute distress.     Appearance: She is ill-appearing.      Comments: Thin and frail looking   HENT:      Head: Normocephalic and atraumatic.      Mouth/Throat:      Mouth: Mucous membranes are moist.   Eyes:      General: No scleral icterus.     Extraocular Movements: Extraocular movements intact.      Conjunctiva/sclera: Conjunctivae normal.      Pupils: Pupils are equal, round, and reactive to light.   Neck:      Vascular: No JVD.   Cardiovascular:      Rate and Rhythm: Normal rate and regular rhythm.      Heart sounds: No murmur heard.  Pulmonary:      Effort: Pulmonary effort is normal.      Breath sounds: Normal breath sounds. No wheezing or rhonchi.   Chest:      Chest wall: No deformity or tenderness.   Breasts:      Right: No swelling, mass, nipple discharge, skin change, tenderness, axillary adenopathy or supraclavicular adenopathy.      Left: Mass present. No nipple discharge, skin change, tenderness, axillary adenopathy or supraclavicular adenopathy.        Comments: Patient has a 2-3 cm hard fixed mass in the tail of the right breast.  She has a 2nd mass mostly right axillary area that is very large fixed and hard.  Abdominal:      General: Bowel  sounds are normal. There is no distension.      Palpations: Abdomen is soft. There is no mass.      Tenderness: There is no abdominal tenderness.   Musculoskeletal:         General: No swelling or deformity.      Cervical back: Neck supple.   Lymphadenopathy:      Cervical: No cervical adenopathy.      Upper Body:      Right upper body: No supraclavicular or axillary adenopathy.      Left upper body: No supraclavicular or axillary adenopathy.      Lower Body: No right inguinal adenopathy. No left inguinal adenopathy.   Skin:     General: Skin is warm.      Coloration: Skin is not jaundiced.      Findings: No lesion or rash.      Nails: There is no clubbing.   Neurological:      General: No focal deficit present.      Mental Status: She is alert and oriented to person, place, and time.      Cranial Nerves: Cranial nerve deficit present.      Comments: Hearing deficit   Psychiatric:         Attention and Perception: Attention normal.         Mood and Affect: Mood and affect normal.         Speech: Speech normal.         Behavior: Behavior is cooperative.         Thought Content: Thought content normal.         Cognition and Memory: Cognition normal.         Judgment: Judgment normal.         Laboratory:  CBC with Differential:  Recent Labs   Lab 07/21/22  1049   WBC 11.5   RBC 3.29*   HCT 30.0*   HGB 9.7*   MCV 91.2   MCH 29.5   RDW 13.8      MPV 10.4     CMP:  Recent Labs   Lab 07/21/22  1049   CALCIUM 8.2*   *   K 4.8   CO2 19*   BUN 23.5*   CREATININE 0.96     BMP:   Recent Labs   Lab 07/21/22  1049   CALCIUM 8.2*   *   K 4.8   CO2 19*   BUN 23.5*   CREATININE 0.96      [facility] Reference Range & Units 07/18/22 17:42   Alkaline Phosphatase 40 - 150 unit/L 76   PROTEIN TOTAL 5.8 - 7.6 gm/dL 6.4   Albumin 3.4 - 4.8 gm/dL 3.2 (L)   Albumin/Globulin Ratio 1.1 - 2.0 ratio 1.0 (L)   BILIRUBIN TOTAL <=1.5 mg/dL 0.9   AST 5 - 34 unit/L 23   ALT 0 - 55 unit/L 23   Globulin, Total 2.4 - 3.5 gm/dL 3.2   (L):  Data is abnormally lowTumor Markers: No results for input(s): PSA, CEA, , AFPTM, [id number],  in the last 24 hours.    Invalid input(s): ALGTM  Immunology: No results for input(s): SPEP, FRANCES, ELIAN, FREELAMBDALI in the last 24 hours.  Coagulation: No results for input(s): PT, INR, APTT in the last 24 hours.  Specimen (24h ago, onward)            None        Microbiology Results (last 7 days)     ** No results found for the last 168 hours. **          Diagnostic Results:  Imaging Results          CTA Chest Non-Coronary (PE Study) (Final result)  Result time 07/18/22 20:30:20    Final result by Kodak Young MD (07/18/22 20:30:20)                 Impression:      1. No evidence of pulmonary embolism through the level of the subsegmental pulmonary arteries.  2. Lobulated mass in the right breast suspicious for malignancy.  3. Lobulated right perihilar soft tissue thickening suspicious for malignancy with metastatic mediastinal adenopathy.      Electronically signed by: Kodak Young MD  Date:    07/18/2022  Time:    20:30             Narrative:    EXAMINATION:  CTA CHEST NON CORONARY    CLINICAL HISTORY:  Pulmonary embolism (PE) suspected, unknown D-dimer;    TECHNIQUE:  Axial images of the chest were obtained with IV contrast administration.  Coronal and sagittal reconstructions were provided.  Three dimensional and MIP images were obtained and evaluated.  Total DLP was 183 mGy-cm. Dose lowering technique and automated exposure control were utilized for this exam.    COMPARISON:  Chest radiograph 07/18/2022.    FINDINGS:  There is no filling defect within the pulmonary arteries through the level of the subsegmental pulmonary arteries.  There is no CT evidence of right heart strain.    The airway is widely patent.  There is enlarged paratracheal, prevascular, and subcarinal lymphadenopathy.  A representative subcarinal lymph node measures 3.2 x 2.2 cm.    There is a right perihilar area of soft tissue thickening  measuring approximately 3.3 cm suspicious for malignancy.  There is a small right pleural effusion.  There is lower lobe atelectasis.    There is a lobulated mass in the right breast measuring 3.7 x 2.6 cm.  The upper abdomen demonstrates a small hiatal hernia.  There is no lytic or blastic osseous lesion.                               X-Ray Chest AP Portable (Final result)  Result time 07/18/22 18:26:37    Final result by Selwyn Pink MD (07/18/22 18:26:37)                 Impression:      Areas of hazy right lung opacity, greatest inferiorly.  Some of this may relate to overlying soft tissue, but pneumonia and right pleural effusion also possible.  Follow-up PA and lateral chest radiographs can be considered.      Electronically signed by: Selwyn Pink  Date:    07/18/2022  Time:    18:26             Narrative:    EXAMINATION:  XR CHEST AP PORTABLE    CLINICAL HISTORY:  hypoxemia;    TECHNIQUE:  Frontal view(s) of the chest.    COMPARISON:  Radiography 01/09/2018    FINDINGS:  Hazy opacity in the lower right lung and extending peripherally over the mid and upper right lung.  No consolidation or significant pleural fluid on the left.  No definitive pneumothorax.  Cardiac silhouette within normal limits for technique.                               X-Ray Femur Ap/Lat Left (Final result)  Result time 07/18/22 18:09:34    Final result by Hayley Wilson MD (07/18/22 18:09:34)                 Impression:      Intertrochanteric fracture left hip      Electronically signed by: Hayley Wilson  Date:    07/18/2022  Time:    18:09             Narrative:    EXAMINATION:  XR FEMUR 2 VIEW LEFT    CLINICAL HISTORY:  Injury, unspecified, initial encounter    TECHNIQUE:  AP and lateral views of the left femur were performed.    COMPARISON:  None}    FINDINGS:  There is an inter trochanteric fracture of the left hip.  No significant displacement is seen.  No angulation is seen.  No other fractures are seen.                                CT Pelvis Without Contrast (Final result)  Result time 07/18/22 18:08:48    Final result by Hayley Wilson MD (07/18/22 18:08:48)                 Impression:      Intertrochanteric fracture seen on the left side as outlined above      Electronically signed by: Hayley Wilson  Date:    07/18/2022  Time:    18:08             Narrative:    EXAMINATION:  CT PELVIS WITHOUT CONTRAST    CLINICAL HISTORY:  Pelvic fracture;    TECHNIQUE:  Multiple axial images were obtained of the pelvis from the iliac crest to the pubic symphysis and sagittal and coronal reconstructions were performed.  No contrast was administered.    COMPARISON:  None    FINDINGS:  There is a fracture along the intertrochanteric region on the left side.  Fracture is not displaced.  There is comminution of the fracture at the level of the greater trochanter.  The ischium appears to be intact on the right and left side.  Iliac bone is intact on the right and left side.  The right proximal femur appears to be intact.  The sacrum is intact.                                    ASSESSMENT/PLAN:     Patient Active Problem List   Diagnosis    Nondisplaced intertrochanteric fracture of left femur, init    Mass of axillary tail of right breast    Adenopathy        Right breast mass       Anemia      Plan  Recommend biopsy of the large right axillary mass.   If positive for breast cancer:   -if ER positive may benefit of endocrine therapy    -if ER negative I will not recommend any treatment at all due to her age and fragility. She will not be able to tolerate chemotherapy and will do more harm than good.  CA 27-29  Cont postop care    Thank you for the consult.  I will continue to follow    Almaz Kelley MD  Hematology/Oncology  [facility]     [place]

## (undated) DEVICE — APPLICATOR CHLORAPREP ORN 26ML

## (undated) DEVICE — K-WIRE
Type: IMPLANTABLE DEVICE | Site: HIP | Status: NON-FUNCTIONAL
Removed: 2022-07-19

## (undated) DEVICE — Device

## (undated) DEVICE — SEE MEDLINE ITEM 157125

## (undated) DEVICE — GLOVE PROTEXIS LTX MICRO 8

## (undated) DEVICE — PAD ABD 8X10 STERILE

## (undated) DEVICE — DRESSING ADAPTIC TOUCH 3X4

## (undated) DEVICE — GAUZE SPONGE 4X4 12PLY

## (undated) DEVICE — DRESSING N ADH OIL EMUL 3X8 3S

## (undated) DEVICE — KIT SURGICAL TURNOVER

## (undated) DEVICE — DRAPE IOBAN 2 STERI

## (undated) DEVICE — SUT VICRYL BR 1 GEN 27 CT-1

## (undated) DEVICE — K-WIRE
Type: IMPLANTABLE DEVICE | Site: HIP | Status: NON-FUNCTIONAL
Brand: GAMMA
Removed: 2022-07-19

## (undated) DEVICE — COVER C-ARM STRAP BAND 44X80IN

## (undated) DEVICE — KIT OTRHOPEDIC FRACTURE

## (undated) DEVICE — DRAPE INCISE IOBAN 2 23X23IN

## (undated) DEVICE — DRAPE C-ARMOR EQUIPMENT COVER

## (undated) DEVICE — SOL NACL IRR 1000ML BTL

## (undated) DEVICE — TAPE ELASTIKON ADHESIVE 3

## (undated) DEVICE — STAPLER SKIN PROXIMATE WIDE

## (undated) DEVICE — GLOVE PROTEXIS BLUE LATEX 8

## (undated) DEVICE — SUT VICRYL 2-0 36 CT-1

## (undated) DEVICE — DRESSING PRIMAPORE 4X3 1/8IN

## (undated) DEVICE — GOWN POLY REINF X-LONG 2XL

## (undated) DEVICE — GUIDE WIRE, BALL-TIPPED, STERILE
Type: IMPLANTABLE DEVICE | Site: HIP | Status: NON-FUNCTIONAL
Removed: 2022-07-19

## (undated) DEVICE — GLOVE PROTEXIS HYDROGEL SZ8

## (undated) DEVICE — ELECTRODE PATIENT RETURN DISP

## (undated) DEVICE — BIT DRILL BN GAMMA 3 4.2MM DIA